# Patient Record
Sex: FEMALE | Race: WHITE | NOT HISPANIC OR LATINO | Employment: OTHER | URBAN - METROPOLITAN AREA
[De-identification: names, ages, dates, MRNs, and addresses within clinical notes are randomized per-mention and may not be internally consistent; named-entity substitution may affect disease eponyms.]

---

## 2017-02-10 ENCOUNTER — OFFICE VISIT (OUTPATIENT)
Dept: CARDIOLOGY | Facility: CLINIC | Age: 82
End: 2017-02-10

## 2017-02-10 VITALS
WEIGHT: 171 LBS | BODY MASS INDEX: 27.48 KG/M2 | DIASTOLIC BLOOD PRESSURE: 78 MMHG | HEIGHT: 66 IN | SYSTOLIC BLOOD PRESSURE: 160 MMHG | HEART RATE: 70 BPM

## 2017-02-10 DIAGNOSIS — I35.0 MILD AORTIC STENOSIS: Primary | Chronic | ICD-10-CM

## 2017-02-10 DIAGNOSIS — I44.7 LBBB (LEFT BUNDLE BRANCH BLOCK): ICD-10-CM

## 2017-02-10 DIAGNOSIS — I50.32 CHRONIC DIASTOLIC HEART FAILURE (HCC): ICD-10-CM

## 2017-02-10 DIAGNOSIS — J43.9 PULMONARY EMPHYSEMA, UNSPECIFIED EMPHYSEMA TYPE (HCC): ICD-10-CM

## 2017-02-10 PROCEDURE — 99214 OFFICE O/P EST MOD 30 MIN: CPT | Performed by: INTERNAL MEDICINE

## 2017-02-10 PROCEDURE — 93000 ELECTROCARDIOGRAM COMPLETE: CPT | Performed by: INTERNAL MEDICINE

## 2017-02-10 RX ORDER — SIMVASTATIN 20 MG
20 TABLET ORAL NIGHTLY
COMMUNITY

## 2017-02-10 RX ORDER — FUROSEMIDE 20 MG/1
20 TABLET ORAL 2 TIMES DAILY
COMMUNITY
End: 2020-12-06 | Stop reason: HOSPADM

## 2017-02-10 NOTE — PROGRESS NOTES
Subjective:     Encounter Date: 2/10/2017      Patient ID: Christine Petersen is a 89 y.o. female.    Chief Complaint:  History of Present Illness     Dear Dr. Kennedy,    This patient comes into the office today for a one-year follow-up of her cardiac status. She has a history of valvular heart disease with mild aortic stenosis.  She had an echocardiogram last year that showed mild aortic stenosis with normal LV function, grade 1 diastolic dysfunction, ejection fraction of 60%, and normal PA pressure.    She does have a history of COPD and is on chronic oxygen therapy.  Over the last year they can recall one time where she needed to use her nebulizer.  Otherwise there is been no problem at all.This patient denies any chest pain, pressure, tightness, squeezing, or heartburn.  This patient has not experienced any feeling of palpitations, tachycardia or heart racing and no presyncope or syncope.  There has not been any problems with dizziness or lightheadedness.  There has not been any orthopnea or PND, and no problems with lower extremity edema.  This patient denies any shortness of breath at rest or with activity and has not had any wheezing.  This patient has not had any problems with unexplained nausea or vomiting. The patient has continued to perform daily activities of living without any specific problem or change in the level of activity.  This patient has not been recently hospitalized for any reason.      The following portions of the patient's history were reviewed and updated as appropriate: allergies, current medications, past family history, past medical history, past social history, past surgical history and problem list.    Past Medical History   Diagnosis Date   • Abdominal aortic aneurysm    • Aortic stenosis    • Congestive heart disease    • COPD (chronic obstructive pulmonary disease)    • Diastolic CHF, chronic    • Malignant neoplasm of lung        History reviewed. No pertinent past surgical  history.    Social History     Social History   • Marital status:      Spouse name: N/A   • Number of children: N/A   • Years of education: N/A     Occupational History   • Not on file.     Social History Main Topics   • Smoking status: Former Smoker   • Smokeless tobacco: Not on file   • Alcohol use No   • Drug use: Not on file   • Sexual activity: Not on file     Other Topics Concern   • Not on file     Social History Narrative         Review of Systems   Constitution: Negative for chills, decreased appetite, fever and night sweats.   HENT: Negative for ear discharge, ear pain, hearing loss, nosebleeds and sore throat.    Eyes: Negative for blurred vision, double vision and pain.   Cardiovascular: Negative for cyanosis.   Respiratory: Negative for hemoptysis and sputum production.    Endocrine: Negative for cold intolerance and heat intolerance.   Hematologic/Lymphatic: Negative for adenopathy.   Skin: Negative for dry skin, itching, nail changes, rash and suspicious lesions.   Musculoskeletal: Negative for arthritis, gout, muscle cramps, muscle weakness, myalgias and neck pain.   Gastrointestinal: Negative for anorexia, bowel incontinence, constipation, diarrhea, dysphagia, hematemesis and jaundice.   Genitourinary: Negative for bladder incontinence, dysuria, flank pain, frequency, hematuria and nocturia.   Neurological: Negative for focal weakness, numbness, paresthesias and seizures.   Psychiatric/Behavioral: Negative for altered mental status, hallucinations, hypervigilance, suicidal ideas and thoughts of violence.   Allergic/Immunologic: Negative for persistent infections.         ECG 12 Lead  Date/Time: 2/10/2017 3:57 PM  Performed by: SHYLA LOBO III  Authorized by: SHYLA LOBO III   Comparison: compared with previous ECG   Similar to previous ECG  Rhythm: sinus rhythm  Rate: normal  Conduction: left bundle branch block  QRS axis: left  Other: no other findings  Clinical impression: abnormal  "ECG               Objective:     Vitals:    02/10/17 1533   BP: 160/78   Pulse: 70   Weight: 171 lb (77.6 kg)   Height: 66\" (167.6 cm)         Physical Exam   Constitutional: She is oriented to person, place, and time. She appears well-developed and well-nourished. No distress.   HENT:   Head: Normocephalic and atraumatic.   Nose: Nose normal.   Mouth/Throat: Oropharynx is clear and moist.   Eyes: Conjunctivae and EOM are normal. Pupils are equal, round, and reactive to light. Right eye exhibits no discharge. Left eye exhibits no discharge.   Neck: Normal range of motion. Neck supple. No tracheal deviation present. No thyromegaly present.   Cardiovascular: Normal rate, regular rhythm, S1 normal, S2 normal and normal pulses.  Exam reveals no S3.    Murmur heard.   Harsh midsystolic murmur is present with a grade of 1/6  at the upper right sternal border radiating to the neck  Pulmonary/Chest: Effort normal. No stridor. No respiratory distress. She exhibits no tenderness.   Abdominal: Soft. Bowel sounds are normal. She exhibits no distension and no mass. There is no tenderness. There is no rebound and no guarding.   Musculoskeletal: Normal range of motion. She exhibits no tenderness or deformity.   Lymphadenopathy:     She has no cervical adenopathy.   Neurological: She is alert and oriented to person, place, and time. She has normal reflexes.   Skin: Skin is warm and dry. No rash noted. She is not diaphoretic. No erythema.   Psychiatric: She has a normal mood and affect. Thought content normal.       Lab Review:       Assessment:          Diagnosis Plan   1. Mild aortic stenosis  ECG 12 Lead   2. Chronic diastolic heart failure     3. Pulmonary emphysema, unspecified emphysema type     4. LBBB (left bundle branch block)  ECG 12 Lead          Plan:         This patient is doing well from a cardiac standpoint.  We will continue her current medical regimen and I will see her back in the office in one year.       Heart " Failure  Assessment  • NYHA class I - There is no limitation of physical activity. Physical activity does not cause fatigue, palpitations or shortness of breath.  • Beta blocker prescribed  • Diuretics prescribed  • Angiotensin receptor blocker (ARB) prescribed  • Left ventricular function is normal by qualitative assessment    Plan  • The patient has received heart failure education on the following topics: dietary sodium restriction, medication instructions, symptom management and weight monitoring  • The heart failure care plan was discussed with the patient today including: continuing the current program    Subjective/Objective    • Physical exam findings negative for S3 gallop.        Current Outpatient Prescriptions:   •  amLODIPine (NORVASC) 10 MG tablet, Take 1 tablet by mouth daily., Disp: , Rfl:   •  aspirin 81 MG tablet, Take 1 tablet by mouth daily., Disp: , Rfl:   •  atenolol (TENORMIN) 25 MG tablet, Take 75 mg by mouth 3 (Three) Times a Day., Disp: , Rfl:   •  Calcium Carbonate-Vitamin D (CALCIUM + D PO), Take 1 tablet by mouth Daily., Disp: , Rfl:   •  furosemide (LASIX) 20 MG tablet, Take 20 mg by mouth 2 (Two) Times a Day., Disp: , Rfl:   •  gabapentin (NEURONTIN) 300 MG capsule, Take 1 capsule by mouth 2 (two) times a day., Disp: , Rfl:   •  losartan (COZAAR) 100 MG tablet, Take 1 tablet by mouth daily., Disp: , Rfl:   •  Multiple Vitamin (MULTI-VITAMIN) tablet, Take 1 tablet by mouth daily., Disp: , Rfl:   •  promethazine (PHENERGAN) 25 MG tablet, Take 1 tablet by mouth 3 (three) times a day as needed., Disp: , Rfl:   •  simvastatin (ZOCOR) 20 MG tablet, Take 20 mg by mouth Every Night., Disp: , Rfl:   •  Tetrahydrozoline HCl (EYE DROPS OP), Apply  to eye., Disp: , Rfl:   •  Timolol Maleate 0.5 % (DAILY) solution, Apply 1 drop to eye daily., Disp: , Rfl:   •  Umeclidinium-Vilanterol 62.5-25 MCG/INH aerosol powder , Inhale., Disp: , Rfl:

## 2020-11-28 ENCOUNTER — APPOINTMENT (OUTPATIENT)
Dept: GENERAL RADIOLOGY | Facility: HOSPITAL | Age: 85
End: 2020-11-28

## 2020-11-28 ENCOUNTER — APPOINTMENT (OUTPATIENT)
Dept: CARDIOLOGY | Facility: HOSPITAL | Age: 85
End: 2020-11-28

## 2020-11-28 ENCOUNTER — HOSPITAL ENCOUNTER (INPATIENT)
Facility: HOSPITAL | Age: 85
LOS: 8 days | Discharge: NURSING, REHAB OR LONG-TERM CARE | End: 2020-12-06
Attending: EMERGENCY MEDICINE | Admitting: INTERNAL MEDICINE

## 2020-11-28 DIAGNOSIS — J96.02 ACUTE RESPIRATORY FAILURE WITH HYPOXIA AND HYPERCAPNIA (HCC): Primary | ICD-10-CM

## 2020-11-28 DIAGNOSIS — J96.01 ACUTE RESPIRATORY FAILURE WITH HYPOXIA AND HYPERCAPNIA (HCC): Primary | ICD-10-CM

## 2020-11-28 DIAGNOSIS — I50.9 ACUTE CONGESTIVE HEART FAILURE, UNSPECIFIED HEART FAILURE TYPE (HCC): ICD-10-CM

## 2020-11-28 LAB
ALBUMIN SERPL-MCNC: 3.8 G/DL (ref 3.5–5.2)
ALBUMIN/GLOB SERPL: 1.2 G/DL
ALP SERPL-CCNC: 66 U/L (ref 39–117)
ALT SERPL W P-5'-P-CCNC: 10 U/L (ref 1–33)
ANION GAP SERPL CALCULATED.3IONS-SCNC: 11.1 MMOL/L (ref 5–15)
AORTIC DIMENSIONLESS INDEX: 0.3 (DI)
ARTERIAL PATENCY WRIST A: ABNORMAL
AST SERPL-CCNC: 19 U/L (ref 1–32)
ATMOSPHERIC PRESS: 757.9 MMHG
B PARAPERT DNA SPEC QL NAA+PROBE: NOT DETECTED
B PERT DNA SPEC QL NAA+PROBE: NOT DETECTED
BASE EXCESS BLDA CALC-SCNC: 1.5 MMOL/L (ref 0–2)
BASOPHILS # BLD AUTO: 0.04 10*3/MM3 (ref 0–0.2)
BASOPHILS NFR BLD AUTO: 0.6 % (ref 0–1.5)
BDY SITE: ABNORMAL
BH CV ECHO MEAS - AO MAX PG (FULL): 25 MMHG
BH CV ECHO MEAS - AO MAX PG: 27.7 MMHG
BH CV ECHO MEAS - AO MEAN PG (FULL): 14 MMHG
BH CV ECHO MEAS - AO MEAN PG: 15 MMHG
BH CV ECHO MEAS - AO ROOT AREA (BSA CORRECTED): 1.9
BH CV ECHO MEAS - AO ROOT AREA: 8.6 CM^2
BH CV ECHO MEAS - AO ROOT DIAM: 3.3 CM
BH CV ECHO MEAS - AO V2 MAX: 263 CM/SEC
BH CV ECHO MEAS - AO V2 MEAN: 181 CM/SEC
BH CV ECHO MEAS - AO V2 VTI: 48.4 CM
BH CV ECHO MEAS - ASC AORTA: 3.6 CM
BH CV ECHO MEAS - AVA(I,A): 0.98 CM^2
BH CV ECHO MEAS - AVA(I,D): 0.98 CM^2
BH CV ECHO MEAS - AVA(V,A): 0.97 CM^2
BH CV ECHO MEAS - AVA(V,D): 0.97 CM^2
BH CV ECHO MEAS - BSA(HAYCOCK): 1.8 M^2
BH CV ECHO MEAS - BSA: 1.8 M^2
BH CV ECHO MEAS - BZI_BMI: 24 KILOGRAMS/M^2
BH CV ECHO MEAS - BZI_METRIC_HEIGHT: 167 CM
BH CV ECHO MEAS - BZI_METRIC_WEIGHT: 67 KG
BH CV ECHO MEAS - EDV(CUBED): 103.8 ML
BH CV ECHO MEAS - EDV(MOD-SP2): 125 ML
BH CV ECHO MEAS - EDV(MOD-SP4): 166 ML
BH CV ECHO MEAS - EDV(TEICH): 102.4 ML
BH CV ECHO MEAS - EF(CUBED): 38.4 %
BH CV ECHO MEAS - EF(MOD-BP): 28.2 %
BH CV ECHO MEAS - EF(MOD-SP2): 35.2 %
BH CV ECHO MEAS - EF(MOD-SP4): 24.1 %
BH CV ECHO MEAS - EF(TEICH): 31.6 %
BH CV ECHO MEAS - ESV(CUBED): 64 ML
BH CV ECHO MEAS - ESV(MOD-SP2): 81 ML
BH CV ECHO MEAS - ESV(MOD-SP4): 126 ML
BH CV ECHO MEAS - ESV(TEICH): 70 ML
BH CV ECHO MEAS - FS: 14.9 %
BH CV ECHO MEAS - IVS/LVPW: 1.3
BH CV ECHO MEAS - IVSD: 1.6 CM
BH CV ECHO MEAS - LV DIASTOLIC VOL/BSA (35-75): 94.7 ML/M^2
BH CV ECHO MEAS - LV MASS(C)D: 265.2 GRAMS
BH CV ECHO MEAS - LV MASS(C)DI: 151.2 GRAMS/M^2
BH CV ECHO MEAS - LV MAX PG: 2.6 MMHG
BH CV ECHO MEAS - LV MEAN PG: 1 MMHG
BH CV ECHO MEAS - LV SYSTOLIC VOL/BSA (12-30): 71.9 ML/M^2
BH CV ECHO MEAS - LV V1 MAX: 81.3 CM/SEC
BH CV ECHO MEAS - LV V1 MEAN: 51.4 CM/SEC
BH CV ECHO MEAS - LV V1 VTI: 15.1 CM
BH CV ECHO MEAS - LVIDD: 4.7 CM
BH CV ECHO MEAS - LVIDS: 4 CM
BH CV ECHO MEAS - LVLD AP2: 8.1 CM
BH CV ECHO MEAS - LVLD AP4: 8.8 CM
BH CV ECHO MEAS - LVLS AP2: 7.5 CM
BH CV ECHO MEAS - LVLS AP4: 8.5 CM
BH CV ECHO MEAS - LVOT AREA (M): 3.1 CM^2
BH CV ECHO MEAS - LVOT AREA: 3.1 CM^2
BH CV ECHO MEAS - LVOT DIAM: 2 CM
BH CV ECHO MEAS - LVPWD: 1.2 CM
BH CV ECHO MEAS - MV DEC SLOPE: 1407 CM/SEC^2
BH CV ECHO MEAS - MV MAX PG: 17.3 MMHG
BH CV ECHO MEAS - MV MEAN PG: 7 MMHG
BH CV ECHO MEAS - MV P1/2T MAX VEL: 219 CM/SEC
BH CV ECHO MEAS - MV P1/2T: 45.6 MSEC
BH CV ECHO MEAS - MV V2 MAX: 208 CM/SEC
BH CV ECHO MEAS - MV V2 MEAN: 119 CM/SEC
BH CV ECHO MEAS - MV V2 VTI: 29.6 CM
BH CV ECHO MEAS - MVA P1/2T LCG: 1 CM^2
BH CV ECHO MEAS - MVA(P1/2T): 4.8 CM^2
BH CV ECHO MEAS - MVA(VTI): 1.6 CM^2
BH CV ECHO MEAS - RAP SYSTOLE: 8 MMHG
BH CV ECHO MEAS - RVOT AREA: 3.1 CM^2
BH CV ECHO MEAS - RVOT DIAM: 2 CM
BH CV ECHO MEAS - RVSP: 48 MMHG
BH CV ECHO MEAS - SI(AO): 236.1 ML/M^2
BH CV ECHO MEAS - SI(CUBED): 22.7 ML/M^2
BH CV ECHO MEAS - SI(LVOT): 27.1 ML/M^2
BH CV ECHO MEAS - SI(MOD-SP2): 25.1 ML/M^2
BH CV ECHO MEAS - SI(MOD-SP4): 22.8 ML/M^2
BH CV ECHO MEAS - SI(TEICH): 18.5 ML/M^2
BH CV ECHO MEAS - SV(AO): 414 ML
BH CV ECHO MEAS - SV(CUBED): 39.8 ML
BH CV ECHO MEAS - SV(LVOT): 47.4 ML
BH CV ECHO MEAS - SV(MOD-SP2): 44 ML
BH CV ECHO MEAS - SV(MOD-SP4): 40 ML
BH CV ECHO MEAS - SV(TEICH): 32.4 ML
BH CV ECHO MEAS - TAPSE (>1.6): 2.8 CM
BH CV ECHO MEAS - TR MAX PG: 40 MMHG
BH CV ECHO MEAS - TR MAX VEL: 315 CM/SEC
BH CV XLRA - RV BASE: 3.9 CM
BH CV XLRA - TDI S': 16.9 CM/SEC
BILIRUB SERPL-MCNC: 0.6 MG/DL (ref 0–1.2)
BUN SERPL-MCNC: 9 MG/DL (ref 8–23)
BUN/CREAT SERPL: 10.8 (ref 7–25)
C PNEUM DNA NPH QL NAA+NON-PROBE: NOT DETECTED
CALCIUM SPEC-SCNC: 8.8 MG/DL (ref 8.2–9.6)
CHLORIDE SERPL-SCNC: 95 MMOL/L (ref 98–107)
CO2 SERPL-SCNC: 23.9 MMOL/L (ref 22–29)
CREAT SERPL-MCNC: 0.83 MG/DL (ref 0.57–1)
D-LACTATE SERPL-SCNC: 1.9 MMOL/L (ref 0.5–2)
D-LACTATE SERPL-SCNC: 2.2 MMOL/L (ref 0.5–2)
DEPRECATED RDW RBC AUTO: 43.7 FL (ref 37–54)
EOSINOPHIL # BLD AUTO: 0.13 10*3/MM3 (ref 0–0.4)
EOSINOPHIL NFR BLD AUTO: 1.8 % (ref 0.3–6.2)
ERYTHROCYTE [DISTWIDTH] IN BLOOD BY AUTOMATED COUNT: 12.8 % (ref 12.3–15.4)
FLUAV SUBTYP SPEC NAA+PROBE: NOT DETECTED
FLUBV RNA ISLT QL NAA+PROBE: NOT DETECTED
GFR SERPL CREATININE-BSD FRML MDRD: 64 ML/MIN/1.73
GLOBULIN UR ELPH-MCNC: 3.3 GM/DL
GLUCOSE BLDC GLUCOMTR-MCNC: 116 MG/DL (ref 70–130)
GLUCOSE BLDC GLUCOMTR-MCNC: 135 MG/DL (ref 70–130)
GLUCOSE BLDC GLUCOMTR-MCNC: 85 MG/DL (ref 70–130)
GLUCOSE SERPL-MCNC: 266 MG/DL (ref 65–99)
HADV DNA SPEC NAA+PROBE: NOT DETECTED
HCO3 BLDA-SCNC: 30.1 MMOL/L (ref 22–28)
HCOV 229E RNA SPEC QL NAA+PROBE: NOT DETECTED
HCOV HKU1 RNA SPEC QL NAA+PROBE: NOT DETECTED
HCOV NL63 RNA SPEC QL NAA+PROBE: NOT DETECTED
HCOV OC43 RNA SPEC QL NAA+PROBE: NOT DETECTED
HCT VFR BLD AUTO: 44.4 % (ref 34–46.6)
HGB BLD-MCNC: 14.1 G/DL (ref 12–15.9)
HMPV RNA NPH QL NAA+NON-PROBE: NOT DETECTED
HPIV1 RNA SPEC QL NAA+PROBE: NOT DETECTED
HPIV2 RNA SPEC QL NAA+PROBE: NOT DETECTED
HPIV3 RNA NPH QL NAA+PROBE: NOT DETECTED
HPIV4 P GENE NPH QL NAA+PROBE: NOT DETECTED
IMM GRANULOCYTES # BLD AUTO: 0.05 10*3/MM3 (ref 0–0.05)
IMM GRANULOCYTES NFR BLD AUTO: 0.7 % (ref 0–0.5)
INHALED O2 CONCENTRATION: 50 %
LACTATE HOLD SPECIMEN: NORMAL
LEFT ATRIUM VOLUME INDEX: 35.8 ML/M2
LYMPHOCYTES # BLD AUTO: 2 10*3/MM3 (ref 0.7–3.1)
LYMPHOCYTES NFR BLD AUTO: 27.9 % (ref 19.6–45.3)
M PNEUMO IGG SER IA-ACNC: NOT DETECTED
MAXIMAL PREDICTED HEART RATE: 125 BPM
MCH RBC QN AUTO: 29.3 PG (ref 26.6–33)
MCHC RBC AUTO-ENTMCNC: 31.8 G/DL (ref 31.5–35.7)
MCV RBC AUTO: 92.3 FL (ref 79–97)
MODALITY: ABNORMAL
MONOCYTES # BLD AUTO: 0.69 10*3/MM3 (ref 0.1–0.9)
MONOCYTES NFR BLD AUTO: 9.6 % (ref 5–12)
NEUTROPHILS NFR BLD AUTO: 4.27 10*3/MM3 (ref 1.7–7)
NEUTROPHILS NFR BLD AUTO: 59.4 % (ref 42.7–76)
NRBC BLD AUTO-RTO: 0 /100 WBC (ref 0–0.2)
NT-PROBNP SERPL-MCNC: 4551 PG/ML (ref 0–1800)
O2 A-A PPRESDIFF RESPIRATORY: 0.3 MMHG
PCO2 BLDA: 62.7 MM HG (ref 35–45)
PEEP RESPIRATORY: 8 CM[H2O]
PH BLDA: 7.29 PH UNITS (ref 7.35–7.45)
PLATELET # BLD AUTO: 294 10*3/MM3 (ref 140–450)
PMV BLD AUTO: 9.6 FL (ref 6–12)
PO2 BLDA: 100.8 MM HG (ref 80–100)
POTASSIUM SERPL-SCNC: 4.3 MMOL/L (ref 3.5–5.2)
PROCALCITONIN SERPL-MCNC: 0.05 NG/ML (ref 0–0.25)
PROT SERPL-MCNC: 7.1 G/DL (ref 6–8.5)
QT INTERVAL: 380 MS
RBC # BLD AUTO: 4.81 10*6/MM3 (ref 3.77–5.28)
RHINOVIRUS RNA SPEC NAA+PROBE: NOT DETECTED
RSV RNA NPH QL NAA+NON-PROBE: NOT DETECTED
SAO2 % BLDCOA: 96.7 % (ref 92–99)
SARS-COV-2 RNA NPH QL NAA+NON-PROBE: NOT DETECTED
SET MECH RESP RATE: 20
SODIUM SERPL-SCNC: 130 MMOL/L (ref 136–145)
STRESS TARGET HR: 106 BPM
TOTAL RATE: 29 BREATHS/MINUTE
TROPONIN T SERPL-MCNC: 0.03 NG/ML (ref 0–0.03)
TROPONIN T SERPL-MCNC: 0.04 NG/ML (ref 0–0.03)
TROPONIN T SERPL-MCNC: <0.01 NG/ML (ref 0–0.03)
VT ON VENT VENT: 400 ML
WBC # BLD AUTO: 7.18 10*3/MM3 (ref 3.4–10.8)

## 2020-11-28 PROCEDURE — 87147 CULTURE TYPE IMMUNOLOGIC: CPT | Performed by: EMERGENCY MEDICINE

## 2020-11-28 PROCEDURE — 25010000002 ENOXAPARIN PER 10 MG: Performed by: INTERNAL MEDICINE

## 2020-11-28 PROCEDURE — 25010000002 PHENYLEPHRINE 10 MG/ML SOLUTION 5 ML VIAL: Performed by: INTERNAL MEDICINE

## 2020-11-28 PROCEDURE — 80053 COMPREHEN METABOLIC PANEL: CPT | Performed by: EMERGENCY MEDICINE

## 2020-11-28 PROCEDURE — 99285 EMERGENCY DEPT VISIT HI MDM: CPT

## 2020-11-28 PROCEDURE — 36600 WITHDRAWAL OF ARTERIAL BLOOD: CPT

## 2020-11-28 PROCEDURE — 85025 COMPLETE CBC W/AUTO DIFF WBC: CPT | Performed by: EMERGENCY MEDICINE

## 2020-11-28 PROCEDURE — 94799 UNLISTED PULMONARY SVC/PX: CPT

## 2020-11-28 PROCEDURE — 25010000002 PERFLUTREN (DEFINITY) 8.476 MG IN SODIUM CHLORIDE 0.9 % 10 ML INJECTION: Performed by: INTERNAL MEDICINE

## 2020-11-28 PROCEDURE — 83880 ASSAY OF NATRIURETIC PEPTIDE: CPT | Performed by: EMERGENCY MEDICINE

## 2020-11-28 PROCEDURE — 83605 ASSAY OF LACTIC ACID: CPT | Performed by: EMERGENCY MEDICINE

## 2020-11-28 PROCEDURE — 87040 BLOOD CULTURE FOR BACTERIA: CPT | Performed by: EMERGENCY MEDICINE

## 2020-11-28 PROCEDURE — 82962 GLUCOSE BLOOD TEST: CPT

## 2020-11-28 PROCEDURE — 93306 TTE W/DOPPLER COMPLETE: CPT

## 2020-11-28 PROCEDURE — 84145 PROCALCITONIN (PCT): CPT | Performed by: EMERGENCY MEDICINE

## 2020-11-28 PROCEDURE — 0202U NFCT DS 22 TRGT SARS-COV-2: CPT | Performed by: EMERGENCY MEDICINE

## 2020-11-28 PROCEDURE — 84484 ASSAY OF TROPONIN QUANT: CPT | Performed by: INTERNAL MEDICINE

## 2020-11-28 PROCEDURE — 94640 AIRWAY INHALATION TREATMENT: CPT

## 2020-11-28 PROCEDURE — 93306 TTE W/DOPPLER COMPLETE: CPT | Performed by: INTERNAL MEDICINE

## 2020-11-28 PROCEDURE — 94660 CPAP INITIATION&MGMT: CPT

## 2020-11-28 PROCEDURE — 93005 ELECTROCARDIOGRAM TRACING: CPT | Performed by: EMERGENCY MEDICINE

## 2020-11-28 PROCEDURE — 99222 1ST HOSP IP/OBS MODERATE 55: CPT | Performed by: INTERNAL MEDICINE

## 2020-11-28 PROCEDURE — 71045 X-RAY EXAM CHEST 1 VIEW: CPT

## 2020-11-28 PROCEDURE — 87150 DNA/RNA AMPLIFIED PROBE: CPT | Performed by: EMERGENCY MEDICINE

## 2020-11-28 PROCEDURE — 82803 BLOOD GASES ANY COMBINATION: CPT

## 2020-11-28 PROCEDURE — 84484 ASSAY OF TROPONIN QUANT: CPT | Performed by: EMERGENCY MEDICINE

## 2020-11-28 PROCEDURE — 25010000002 FUROSEMIDE PER 20 MG: Performed by: EMERGENCY MEDICINE

## 2020-11-28 PROCEDURE — 25010000002 FUROSEMIDE PER 20 MG: Performed by: INTERNAL MEDICINE

## 2020-11-28 RX ORDER — BUDESONIDE AND FORMOTEROL FUMARATE DIHYDRATE 160; 4.5 UG/1; UG/1
2 AEROSOL RESPIRATORY (INHALATION)
Status: DISCONTINUED | OUTPATIENT
Start: 2020-11-28 | End: 2020-12-06 | Stop reason: HOSPADM

## 2020-11-28 RX ORDER — LOSARTAN POTASSIUM 100 MG/1
100 TABLET ORAL DAILY
Status: DISCONTINUED | OUTPATIENT
Start: 2020-11-28 | End: 2020-11-30

## 2020-11-28 RX ORDER — SODIUM CHLORIDE 0.9 % (FLUSH) 0.9 %
10 SYRINGE (ML) INJECTION EVERY 12 HOURS SCHEDULED
Status: DISCONTINUED | OUTPATIENT
Start: 2020-11-28 | End: 2020-12-06 | Stop reason: HOSPADM

## 2020-11-28 RX ORDER — DEXTROSE MONOHYDRATE 25 G/50ML
25 INJECTION, SOLUTION INTRAVENOUS
Status: DISCONTINUED | OUTPATIENT
Start: 2020-11-28 | End: 2020-12-01

## 2020-11-28 RX ORDER — GABAPENTIN 300 MG/1
300 CAPSULE ORAL EVERY 12 HOURS SCHEDULED
Status: DISCONTINUED | OUTPATIENT
Start: 2020-11-28 | End: 2020-12-06 | Stop reason: HOSPADM

## 2020-11-28 RX ORDER — ONDANSETRON 4 MG/1
4 TABLET, FILM COATED ORAL EVERY 6 HOURS PRN
Status: DISCONTINUED | OUTPATIENT
Start: 2020-11-28 | End: 2020-12-06 | Stop reason: HOSPADM

## 2020-11-28 RX ORDER — SODIUM CHLORIDE 0.9 % (FLUSH) 0.9 %
10 SYRINGE (ML) INJECTION AS NEEDED
Status: DISCONTINUED | OUTPATIENT
Start: 2020-11-28 | End: 2020-12-06 | Stop reason: HOSPADM

## 2020-11-28 RX ORDER — NICOTINE POLACRILEX 4 MG
15 LOZENGE BUCCAL
Status: DISCONTINUED | OUTPATIENT
Start: 2020-11-28 | End: 2020-12-01

## 2020-11-28 RX ORDER — ENALAPRILAT 2.5 MG/2ML
0.62 INJECTION INTRAVENOUS EVERY 6 HOURS PRN
Status: DISCONTINUED | OUTPATIENT
Start: 2020-11-28 | End: 2020-12-06 | Stop reason: HOSPADM

## 2020-11-28 RX ORDER — FUROSEMIDE 10 MG/ML
40 INJECTION INTRAMUSCULAR; INTRAVENOUS ONCE
Status: COMPLETED | OUTPATIENT
Start: 2020-11-28 | End: 2020-11-28

## 2020-11-28 RX ORDER — ATORVASTATIN CALCIUM 20 MG/1
10 TABLET, FILM COATED ORAL DAILY
Status: DISCONTINUED | OUTPATIENT
Start: 2020-11-28 | End: 2020-12-06 | Stop reason: HOSPADM

## 2020-11-28 RX ORDER — IPRATROPIUM BROMIDE AND ALBUTEROL SULFATE 2.5; .5 MG/3ML; MG/3ML
3 SOLUTION RESPIRATORY (INHALATION)
Status: DISCONTINUED | OUTPATIENT
Start: 2020-11-28 | End: 2020-12-06 | Stop reason: HOSPADM

## 2020-11-28 RX ORDER — ONDANSETRON 2 MG/ML
4 INJECTION INTRAMUSCULAR; INTRAVENOUS EVERY 6 HOURS PRN
Status: DISCONTINUED | OUTPATIENT
Start: 2020-11-28 | End: 2020-12-06 | Stop reason: HOSPADM

## 2020-11-28 RX ORDER — POTASSIUM CHLORIDE 1.5 G/1.77G
40 POWDER, FOR SOLUTION ORAL AS NEEDED
Status: DISCONTINUED | OUTPATIENT
Start: 2020-11-28 | End: 2020-12-06 | Stop reason: HOSPADM

## 2020-11-28 RX ORDER — MAGNESIUM SULFATE HEPTAHYDRATE 40 MG/ML
2 INJECTION, SOLUTION INTRAVENOUS AS NEEDED
Status: DISCONTINUED | OUTPATIENT
Start: 2020-11-28 | End: 2020-12-06 | Stop reason: HOSPADM

## 2020-11-28 RX ORDER — POTASSIUM CHLORIDE 7.45 MG/ML
10 INJECTION INTRAVENOUS
Status: DISCONTINUED | OUTPATIENT
Start: 2020-11-28 | End: 2020-12-06 | Stop reason: HOSPADM

## 2020-11-28 RX ORDER — ACETAMINOPHEN 325 MG/1
650 TABLET ORAL EVERY 4 HOURS PRN
Status: DISCONTINUED | OUTPATIENT
Start: 2020-11-28 | End: 2020-12-06 | Stop reason: HOSPADM

## 2020-11-28 RX ORDER — ACETAMINOPHEN 650 MG/1
650 SUPPOSITORY RECTAL EVERY 4 HOURS PRN
Status: DISCONTINUED | OUTPATIENT
Start: 2020-11-28 | End: 2020-12-06 | Stop reason: HOSPADM

## 2020-11-28 RX ORDER — POTASSIUM CHLORIDE 750 MG/1
40 TABLET, FILM COATED, EXTENDED RELEASE ORAL AS NEEDED
Status: DISCONTINUED | OUTPATIENT
Start: 2020-11-28 | End: 2020-12-06 | Stop reason: HOSPADM

## 2020-11-28 RX ORDER — FUROSEMIDE 10 MG/ML
40 INJECTION INTRAMUSCULAR; INTRAVENOUS
Status: DISCONTINUED | OUTPATIENT
Start: 2020-11-28 | End: 2020-11-28

## 2020-11-28 RX ORDER — MAGNESIUM SULFATE HEPTAHYDRATE 40 MG/ML
4 INJECTION, SOLUTION INTRAVENOUS AS NEEDED
Status: DISCONTINUED | OUTPATIENT
Start: 2020-11-28 | End: 2020-12-06 | Stop reason: HOSPADM

## 2020-11-28 RX ORDER — ASPIRIN 81 MG/1
81 TABLET ORAL DAILY
Status: DISCONTINUED | OUTPATIENT
Start: 2020-11-28 | End: 2020-12-06 | Stop reason: HOSPADM

## 2020-11-28 RX ADMIN — GABAPENTIN 300 MG: 300 CAPSULE ORAL at 12:36

## 2020-11-28 RX ADMIN — FUROSEMIDE 40 MG: 10 INJECTION, SOLUTION INTRAMUSCULAR; INTRAVENOUS at 09:19

## 2020-11-28 RX ADMIN — FUROSEMIDE 40 MG: 10 INJECTION, SOLUTION INTRAMUSCULAR; INTRAVENOUS at 12:27

## 2020-11-28 RX ADMIN — BUDESONIDE AND FORMOTEROL FUMARATE DIHYDRATE 2 PUFF: 160; 4.5 AEROSOL RESPIRATORY (INHALATION) at 19:54

## 2020-11-28 RX ADMIN — ASPIRIN 81 MG: 81 TABLET, COATED ORAL at 12:28

## 2020-11-28 RX ADMIN — GABAPENTIN 300 MG: 300 CAPSULE ORAL at 20:03

## 2020-11-28 RX ADMIN — PHENYLEPHRINE HYDROCHLORIDE 0.5 MCG/KG/MIN: 10 INJECTION INTRAVENOUS at 17:54

## 2020-11-28 RX ADMIN — ENOXAPARIN SODIUM 40 MG: 40 INJECTION SUBCUTANEOUS at 12:27

## 2020-11-28 RX ADMIN — ATORVASTATIN CALCIUM 10 MG: 20 TABLET, FILM COATED ORAL at 12:27

## 2020-11-28 RX ADMIN — SODIUM CHLORIDE, PRESERVATIVE FREE 10 ML: 5 INJECTION INTRAVENOUS at 12:27

## 2020-11-28 RX ADMIN — PERFLUTREN 2 ML: 6.52 INJECTION, SUSPENSION INTRAVENOUS at 13:55

## 2020-11-28 RX ADMIN — TIOTROPIUM BROMIDE INHALATION SPRAY 2 PUFF: 3.12 SPRAY, METERED RESPIRATORY (INHALATION) at 16:00

## 2020-11-28 RX ADMIN — BUDESONIDE AND FORMOTEROL FUMARATE DIHYDRATE 2 PUFF: 160; 4.5 AEROSOL RESPIRATORY (INHALATION) at 16:00

## 2020-11-28 RX ADMIN — LOSARTAN POTASSIUM 100 MG: 100 TABLET, FILM COATED ORAL at 12:27

## 2020-11-29 LAB
ALBUMIN SERPL-MCNC: 3.4 G/DL (ref 3.5–5.2)
ALBUMIN/GLOB SERPL: 1.3 G/DL
ALP SERPL-CCNC: 54 U/L (ref 39–117)
ALT SERPL W P-5'-P-CCNC: 9 U/L (ref 1–33)
ANION GAP SERPL CALCULATED.3IONS-SCNC: 11.7 MMOL/L (ref 5–15)
AST SERPL-CCNC: 19 U/L (ref 1–32)
BACTERIA BLD CULT: ABNORMAL
BASOPHILS # BLD AUTO: 0.02 10*3/MM3 (ref 0–0.2)
BASOPHILS NFR BLD AUTO: 0.3 % (ref 0–1.5)
BILIRUB SERPL-MCNC: 0.7 MG/DL (ref 0–1.2)
BUN SERPL-MCNC: 13 MG/DL (ref 8–23)
BUN/CREAT SERPL: 17.8 (ref 7–25)
CALCIUM SPEC-SCNC: 8.4 MG/DL (ref 8.2–9.6)
CHLORIDE SERPL-SCNC: 91 MMOL/L (ref 98–107)
CO2 SERPL-SCNC: 26.3 MMOL/L (ref 22–29)
CREAT SERPL-MCNC: 0.73 MG/DL (ref 0.57–1)
DEPRECATED RDW RBC AUTO: 42.6 FL (ref 37–54)
EOSINOPHIL # BLD AUTO: 0.03 10*3/MM3 (ref 0–0.4)
EOSINOPHIL NFR BLD AUTO: 0.4 % (ref 0.3–6.2)
ERYTHROCYTE [DISTWIDTH] IN BLOOD BY AUTOMATED COUNT: 13.1 % (ref 12.3–15.4)
GFR SERPL CREATININE-BSD FRML MDRD: 74 ML/MIN/1.73
GLOBULIN UR ELPH-MCNC: 2.6 GM/DL
GLUCOSE BLDC GLUCOMTR-MCNC: 133 MG/DL (ref 70–130)
GLUCOSE BLDC GLUCOMTR-MCNC: 85 MG/DL (ref 70–130)
GLUCOSE BLDC GLUCOMTR-MCNC: 88 MG/DL (ref 70–130)
GLUCOSE SERPL-MCNC: 106 MG/DL (ref 65–99)
HCT VFR BLD AUTO: 37.5 % (ref 34–46.6)
HGB BLD-MCNC: 12.4 G/DL (ref 12–15.9)
IMM GRANULOCYTES # BLD AUTO: 0.04 10*3/MM3 (ref 0–0.05)
IMM GRANULOCYTES NFR BLD AUTO: 0.6 % (ref 0–0.5)
LYMPHOCYTES # BLD AUTO: 0.66 10*3/MM3 (ref 0.7–3.1)
LYMPHOCYTES NFR BLD AUTO: 9.9 % (ref 19.6–45.3)
MCH RBC QN AUTO: 29.7 PG (ref 26.6–33)
MCHC RBC AUTO-ENTMCNC: 33.1 G/DL (ref 31.5–35.7)
MCV RBC AUTO: 89.7 FL (ref 79–97)
MONOCYTES # BLD AUTO: 0.45 10*3/MM3 (ref 0.1–0.9)
MONOCYTES NFR BLD AUTO: 6.7 % (ref 5–12)
NEUTROPHILS NFR BLD AUTO: 5.49 10*3/MM3 (ref 1.7–7)
NEUTROPHILS NFR BLD AUTO: 82.1 % (ref 42.7–76)
NRBC BLD AUTO-RTO: 0 /100 WBC (ref 0–0.2)
PLATELET # BLD AUTO: 226 10*3/MM3 (ref 140–450)
PMV BLD AUTO: 9.6 FL (ref 6–12)
POTASSIUM SERPL-SCNC: 4 MMOL/L (ref 3.5–5.2)
PROT SERPL-MCNC: 6 G/DL (ref 6–8.5)
RBC # BLD AUTO: 4.18 10*6/MM3 (ref 3.77–5.28)
SODIUM SERPL-SCNC: 129 MMOL/L (ref 136–145)
TROPONIN T SERPL-MCNC: 0.02 NG/ML (ref 0–0.03)
WBC # BLD AUTO: 6.69 10*3/MM3 (ref 3.4–10.8)

## 2020-11-29 PROCEDURE — 82962 GLUCOSE BLOOD TEST: CPT

## 2020-11-29 PROCEDURE — 99232 SBSQ HOSP IP/OBS MODERATE 35: CPT | Performed by: INTERNAL MEDICINE

## 2020-11-29 PROCEDURE — 94799 UNLISTED PULMONARY SVC/PX: CPT

## 2020-11-29 PROCEDURE — 80053 COMPREHEN METABOLIC PANEL: CPT | Performed by: INTERNAL MEDICINE

## 2020-11-29 PROCEDURE — 25010000002 ENOXAPARIN PER 10 MG: Performed by: INTERNAL MEDICINE

## 2020-11-29 PROCEDURE — 85025 COMPLETE CBC W/AUTO DIFF WBC: CPT | Performed by: INTERNAL MEDICINE

## 2020-11-29 PROCEDURE — 84484 ASSAY OF TROPONIN QUANT: CPT | Performed by: INTERNAL MEDICINE

## 2020-11-29 RX ADMIN — GABAPENTIN 300 MG: 300 CAPSULE ORAL at 20:32

## 2020-11-29 RX ADMIN — GABAPENTIN 300 MG: 300 CAPSULE ORAL at 08:02

## 2020-11-29 RX ADMIN — BUDESONIDE AND FORMOTEROL FUMARATE DIHYDRATE 2 PUFF: 160; 4.5 AEROSOL RESPIRATORY (INHALATION) at 19:44

## 2020-11-29 RX ADMIN — SODIUM CHLORIDE, PRESERVATIVE FREE 10 ML: 5 INJECTION INTRAVENOUS at 08:02

## 2020-11-29 RX ADMIN — BUDESONIDE AND FORMOTEROL FUMARATE DIHYDRATE 2 PUFF: 160; 4.5 AEROSOL RESPIRATORY (INHALATION) at 07:48

## 2020-11-29 RX ADMIN — TIOTROPIUM BROMIDE INHALATION SPRAY 2 PUFF: 3.12 SPRAY, METERED RESPIRATORY (INHALATION) at 07:49

## 2020-11-29 RX ADMIN — LOSARTAN POTASSIUM 100 MG: 100 TABLET, FILM COATED ORAL at 08:02

## 2020-11-29 RX ADMIN — ATORVASTATIN CALCIUM 10 MG: 20 TABLET, FILM COATED ORAL at 08:02

## 2020-11-29 RX ADMIN — ASPIRIN 81 MG: 81 TABLET, COATED ORAL at 08:02

## 2020-11-29 RX ADMIN — ENOXAPARIN SODIUM 40 MG: 40 INJECTION SUBCUTANEOUS at 13:06

## 2020-11-30 LAB
ANION GAP SERPL CALCULATED.3IONS-SCNC: 8.4 MMOL/L (ref 5–15)
BACTERIA SPEC AEROBE CULT: ABNORMAL
BUN SERPL-MCNC: 10 MG/DL (ref 8–23)
BUN/CREAT SERPL: 15.9 (ref 7–25)
CALCIUM SPEC-SCNC: 8.3 MG/DL (ref 8.2–9.6)
CHLORIDE SERPL-SCNC: 89 MMOL/L (ref 98–107)
CO2 SERPL-SCNC: 27.6 MMOL/L (ref 22–29)
CREAT SERPL-MCNC: 0.63 MG/DL (ref 0.57–1)
GFR SERPL CREATININE-BSD FRML MDRD: 88 ML/MIN/1.73
GLUCOSE BLDC GLUCOMTR-MCNC: 101 MG/DL (ref 70–130)
GLUCOSE BLDC GLUCOMTR-MCNC: 87 MG/DL (ref 70–130)
GLUCOSE BLDC GLUCOMTR-MCNC: 92 MG/DL (ref 70–130)
GLUCOSE BLDC GLUCOMTR-MCNC: 96 MG/DL (ref 70–130)
GLUCOSE SERPL-MCNC: 92 MG/DL (ref 65–99)
GRAM STN SPEC: ABNORMAL
ISOLATED FROM: ABNORMAL
POTASSIUM SERPL-SCNC: 3.9 MMOL/L (ref 3.5–5.2)
SODIUM SERPL-SCNC: 125 MMOL/L (ref 136–145)

## 2020-11-30 PROCEDURE — 99232 SBSQ HOSP IP/OBS MODERATE 35: CPT | Performed by: INTERNAL MEDICINE

## 2020-11-30 PROCEDURE — 94799 UNLISTED PULMONARY SVC/PX: CPT

## 2020-11-30 PROCEDURE — 80048 BASIC METABOLIC PNL TOTAL CA: CPT | Performed by: INTERNAL MEDICINE

## 2020-11-30 PROCEDURE — 25010000002 FUROSEMIDE PER 20 MG: Performed by: INTERNAL MEDICINE

## 2020-11-30 PROCEDURE — 25010000002 ENOXAPARIN PER 10 MG: Performed by: INTERNAL MEDICINE

## 2020-11-30 PROCEDURE — 82962 GLUCOSE BLOOD TEST: CPT

## 2020-11-30 RX ORDER — LOPERAMIDE HYDROCHLORIDE 2 MG/1
2 CAPSULE ORAL 4 TIMES DAILY PRN
Status: DISCONTINUED | OUTPATIENT
Start: 2020-11-30 | End: 2020-12-06 | Stop reason: HOSPADM

## 2020-11-30 RX ORDER — FUROSEMIDE 10 MG/ML
40 INJECTION INTRAMUSCULAR; INTRAVENOUS ONCE
Status: COMPLETED | OUTPATIENT
Start: 2020-11-30 | End: 2020-11-30

## 2020-11-30 RX ADMIN — BUDESONIDE AND FORMOTEROL FUMARATE DIHYDRATE 2 PUFF: 160; 4.5 AEROSOL RESPIRATORY (INHALATION) at 08:01

## 2020-11-30 RX ADMIN — ATORVASTATIN CALCIUM 10 MG: 20 TABLET, FILM COATED ORAL at 08:42

## 2020-11-30 RX ADMIN — METOPROLOL TARTRATE 12.5 MG: 25 TABLET, FILM COATED ORAL at 20:07

## 2020-11-30 RX ADMIN — ENOXAPARIN SODIUM 40 MG: 40 INJECTION SUBCUTANEOUS at 12:19

## 2020-11-30 RX ADMIN — LOPERAMIDE HYDROCHLORIDE 2 MG: 2 CAPSULE ORAL at 20:06

## 2020-11-30 RX ADMIN — GABAPENTIN 300 MG: 300 CAPSULE ORAL at 08:42

## 2020-11-30 RX ADMIN — METOPROLOL TARTRATE 12.5 MG: 25 TABLET, FILM COATED ORAL at 10:21

## 2020-11-30 RX ADMIN — BUDESONIDE AND FORMOTEROL FUMARATE DIHYDRATE 2 PUFF: 160; 4.5 AEROSOL RESPIRATORY (INHALATION) at 19:33

## 2020-11-30 RX ADMIN — TIOTROPIUM BROMIDE INHALATION SPRAY 2 PUFF: 3.12 SPRAY, METERED RESPIRATORY (INHALATION) at 08:03

## 2020-11-30 RX ADMIN — SODIUM CHLORIDE, PRESERVATIVE FREE 10 ML: 5 INJECTION INTRAVENOUS at 21:55

## 2020-11-30 RX ADMIN — GABAPENTIN 300 MG: 300 CAPSULE ORAL at 20:06

## 2020-11-30 RX ADMIN — FUROSEMIDE 40 MG: 10 INJECTION, SOLUTION INTRAMUSCULAR; INTRAVENOUS at 12:19

## 2020-11-30 RX ADMIN — ASPIRIN 81 MG: 81 TABLET, COATED ORAL at 08:42

## 2020-12-01 LAB
ANION GAP SERPL CALCULATED.3IONS-SCNC: 8.7 MMOL/L (ref 5–15)
BUN SERPL-MCNC: 10 MG/DL (ref 8–23)
BUN/CREAT SERPL: 17.2 (ref 7–25)
CALCIUM SPEC-SCNC: 8.4 MG/DL (ref 8.2–9.6)
CHLORIDE SERPL-SCNC: 89 MMOL/L (ref 98–107)
CO2 SERPL-SCNC: 28.3 MMOL/L (ref 22–29)
CREAT SERPL-MCNC: 0.58 MG/DL (ref 0.57–1)
GFR SERPL CREATININE-BSD FRML MDRD: 97 ML/MIN/1.73
GLUCOSE BLDC GLUCOMTR-MCNC: 108 MG/DL (ref 70–130)
GLUCOSE BLDC GLUCOMTR-MCNC: 109 MG/DL (ref 70–130)
GLUCOSE BLDC GLUCOMTR-MCNC: 157 MG/DL (ref 70–130)
GLUCOSE BLDC GLUCOMTR-MCNC: 93 MG/DL (ref 70–130)
GLUCOSE SERPL-MCNC: 90 MG/DL (ref 65–99)
POTASSIUM SERPL-SCNC: 4.1 MMOL/L (ref 3.5–5.2)
SODIUM SERPL-SCNC: 126 MMOL/L (ref 136–145)

## 2020-12-01 PROCEDURE — 94799 UNLISTED PULMONARY SVC/PX: CPT

## 2020-12-01 PROCEDURE — 80048 BASIC METABOLIC PNL TOTAL CA: CPT | Performed by: INTERNAL MEDICINE

## 2020-12-01 PROCEDURE — 25010000002 ENOXAPARIN PER 10 MG: Performed by: INTERNAL MEDICINE

## 2020-12-01 PROCEDURE — 97530 THERAPEUTIC ACTIVITIES: CPT

## 2020-12-01 PROCEDURE — 97162 PT EVAL MOD COMPLEX 30 MIN: CPT

## 2020-12-01 PROCEDURE — 82962 GLUCOSE BLOOD TEST: CPT

## 2020-12-01 PROCEDURE — 99232 SBSQ HOSP IP/OBS MODERATE 35: CPT | Performed by: INTERNAL MEDICINE

## 2020-12-01 RX ADMIN — GABAPENTIN 300 MG: 300 CAPSULE ORAL at 20:10

## 2020-12-01 RX ADMIN — SODIUM CHLORIDE, PRESERVATIVE FREE 10 ML: 5 INJECTION INTRAVENOUS at 20:15

## 2020-12-01 RX ADMIN — SODIUM CHLORIDE, PRESERVATIVE FREE 10 ML: 5 INJECTION INTRAVENOUS at 08:58

## 2020-12-01 RX ADMIN — ATORVASTATIN CALCIUM 10 MG: 20 TABLET, FILM COATED ORAL at 08:56

## 2020-12-01 RX ADMIN — METOPROLOL TARTRATE 12.5 MG: 25 TABLET, FILM COATED ORAL at 20:10

## 2020-12-01 RX ADMIN — ACETAMINOPHEN 650 MG: 325 TABLET, FILM COATED ORAL at 04:56

## 2020-12-01 RX ADMIN — ASPIRIN 81 MG: 81 TABLET, COATED ORAL at 08:57

## 2020-12-01 RX ADMIN — METOPROLOL TARTRATE 12.5 MG: 25 TABLET, FILM COATED ORAL at 08:57

## 2020-12-01 RX ADMIN — BUDESONIDE AND FORMOTEROL FUMARATE DIHYDRATE 2 PUFF: 160; 4.5 AEROSOL RESPIRATORY (INHALATION) at 08:14

## 2020-12-01 RX ADMIN — ENOXAPARIN SODIUM 40 MG: 40 INJECTION SUBCUTANEOUS at 11:43

## 2020-12-01 RX ADMIN — GABAPENTIN 300 MG: 300 CAPSULE ORAL at 08:57

## 2020-12-01 RX ADMIN — TIOTROPIUM BROMIDE INHALATION SPRAY 2 PUFF: 3.12 SPRAY, METERED RESPIRATORY (INHALATION) at 08:15

## 2020-12-01 NOTE — DISCHARGE PLACEMENT REQUEST
"Christine Bernstein (89 y.o. Female)     Date of Birth Social Security Number Address Home Phone MRN    07/27/1925  [address]  [facility] [address] [phone] [id number]    Sabianism Marital Status          Caodaism        Admission Date Admission Type Admitting Provider Attending Provider Department, Room/Bed    11/28/20 Emergency Carrington Olivares MD Kellie, Scott P, MD [facility] INTENSIVE CARE, I377/1    Discharge Date Discharge Disposition Discharge Destination                       Attending Provider: Carrington Olivares MD    Allergies: No Known Allergies    Isolation: None   Infection: None   Code Status: No CPR    Ht: 167 cm (65.75\")   Wt: 67 kg (147 lb 11.3 oz)    Admission Cmt: None   Principal Problem: None                Active Insurance as of 11/28/2020     Primary Coverage     Payor Plan Insurance Group Employer/Plan Group    MEDICARE MEDICARE A & B      Payor Plan Address Payor Plan Phone Number Payor Plan Fax Number Effective Dates    [address] [phone]  8/1/1990 - None Entered    [facility] [id number]       Subscriber Name Subscriber Birth Date Member ID       CHRISTINE BERNSTEIN 7/27/1925 [id number]           Secondary Coverage     Payor Plan Insurance Group Employer/Plan Group    [employer] LIFE [employer] CONSTITUTION LIFE INSURANCE [id number]     Payor Plan Address Payor Plan Phone Number Payor Plan Fax Number Effective Dates    PO  [phone]  10/1/1992 - None Entered    [facility] [id number]       Subscriber Name Subscriber Birth Date Member ID       CHRISTINE BERNSTEIN 7/27/1925 [id number]                 Emergency Contacts      (Rel.) Home Phone Work Phone Mobile Phone    NicolaChaparro (Son) -- -- [phone]    NicolaJoey (Son) -- -- [phone]              "

## 2020-12-01 NOTE — THERAPY EVALUATION
Patient Name: Christine Petersen  : 1925    MRN: [id number]                              Today's Date: 2020       Admit Date: 2020    Visit Dx:     ICD-10-CM ICD-9-CM   1. Acute respiratory failure with hypoxia and hypercapnia (CMS/HCC)  J96.01 518.81    J96.02    2. Acute congestive heart failure, unspecified heart failure type (CMS/HCC)  I50.9 428.0     Patient Active Problem List   Diagnosis   • Mild aortic stenosis   • Chronic diastolic heart failure (CMS/HCC)   • COPD (chronic obstructive pulmonary disease) (CMS/HCC)   • Acute respiratory failure with hypoxia and hypercapnia (CMS/HCC)     Past Medical History:   Diagnosis Date   • Abdominal aortic aneurysm (CMS/HCC)    • Aortic stenosis    • Congestive heart disease (CMS/HCC)    • COPD (chronic obstructive pulmonary disease) (CMS/HCC)    • Diastolic CHF, chronic (CMS/HCC)    • Malignant neoplasm of lung (CMS/HCC)      History reviewed. No pertinent surgical history.  General Information     Row Name 20 [facility]8          Physical Therapy Time and Intention    Document Type  evaluation  -     Mode of Treatment  individual therapy;physical therapy  -     Row Name 20 1208          General Information    Prior Level of Function  independent:;gait;transfer;bed mobility no AD at baseline but has walker at home if needed  -     Existing Precautions/Restrictions  fall  -     Barriers to Rehab  medically complex  -     Row Name 20 1208          Living Environment    Lives With  child(stephon), adult  -     Row Name 20 1208          Cognition    Orientation Status (Cognition)  oriented x 3 pt seems a bit forgetful at time as she asked the same question multiple times  -     Row Name 20 1208          Safety Issues, Functional Mobility    Impairments Affecting Function (Mobility)  balance;endurance/activity tolerance;strength  -       User Key  (r) = Recorded By, (t) = Taken By, (c) = Cosigned By    Initials Name Provider  Type    CH Jocelyne Berry, PT Physical Therapist        Mobility     Row Name 12/01/20 1210          Bed Mobility    Bed Mobility  supine-sit;sit-supine  -     Supine-Sit [place] (Bed Mobility)  verbal cues;nonverbal cues (demo/gesture);minimum assist (75% patient effort)  -     Sit-Supine [place] (Bed Mobility)  verbal cues;nonverbal cues (demo/gesture);contact guard  -     Row Name 12/01/20 1210          Sit-Stand Transfer    Sit-Stand [place] (Transfers)  verbal cues;nonverbal cues (demo/gesture);minimum assist (75% patient effort)  -     Assistive Device (Sit-Stand Transfers)  -- HHA  -     Row Name 12/01/20 1210          Gait/Stairs (Locomotion)    [place] Level (Gait)  verbal cues;nonverbal cues (demo/gesture);contact guard;minimum assist (75% patient effort)  -     Assistive Device (Gait)  walker, front-wheeled pt walked 10ft in room with HHA and Tejal, the remaining 50 ft was with a walker and [facility]  -     Distance in Feet (Gait)  60  -CH     Deviations/Abnormal Patterns (Gait)  anaya decreased;gait speed decreased;stride length decreased  -     Bilateral Gait Deviations  forward flexed posture  -       User Key  (r) = Recorded By, (t) = Taken By, (c) = Cosigned By    Initials Name Provider Type    Jocelyne Albarado PT Physical Therapist        Obj/Interventions     Row Name 12/01/20 1212          Range of Motion Comprehensive    General Range of Motion  no range of motion deficits identified  -     Row Name 12/01/20 1212          Strength Comprehensive (MMT)    Comment, General Manual Muscle Testing (MMT) Assessment  genralized weakness noted with functional mobility, B LE grossly >/=3+/5  -     Row Name 12/01/20 1212          Balance    Balance Assessment  standing static balance;standing dynamic balance  -     Static Standing Balance  mild impairment  -     Dynamic Standing Balance  mild impairment  -       User Key  (r) = Recorded By, (t) = Taken  By, (c) = Cosigned By    Initials Name Provider Type     Jocelyne Berry, PT Physical Therapist        Goals/Plan     Row Name 12/01/20 1215          Bed Mobility Goal 1 (PT)    Activity/Assistive Device (Bed Mobility Goal 1, PT)  bed mobility activities, all  -CH     [place] Level/Cues Needed (Bed Mobility Goal 1, PT)  supervision required  -CH     Time Frame (Bed Mobility Goal 1, PT)  1 week  -     Row Name 12/01/20 1215          Transfer Goal 1 (PT)    Activity/Assistive Device (Transfer Goal 1, PT)  transfers, all;walker, rolling  -CH     [place] Level/Cues Needed (Transfer Goal 1, PT)  supervision required  -CH     Time Frame (Transfer Goal 1, PT)  1 week  -     Row Name 12/01/20 1215          Gait Training Goal 1 (PT)    Activity/Assistive Device (Gait Training Goal 1, PT)  gait (walking locomotion);walker, rolling  -CH     [place] Level (Gait Training Goal 1, PT)  supervision required  -CH     Distance (Gait Training Goal 1, PT)  150  -CH     Time Frame (Gait Training Goal 1, PT)  1 week  -       User Key  (r) = Recorded By, (t) = Taken By, (c) = Cosigned By    Initials Name Provider Type     Jocelyne Berry, PT Physical Therapist        Clinical Impression     Row Name 12/01/20 1213          Pain    Additional Documentation  Pain Scale: FACES Pre/Post-Treatment (Group)  -     Row Name 12/01/20 1213          Pain Scale: FACES Pre/Post-Treatment    Pain: FACES Scale, Pretreatment  0-->no hurt  -     Posttreatment Pain Rating  0-->no hurt  -     Row Name 12/01/20 1213          Plan of Care Review    Plan of Care Reviewed With  patient  -     Outcome Summary  Pt is a 89 yo F who was admtited [facility] SOA, acute respiratory failure, and CHF. Pt presents to PT with some impaired functioanl mobility and gait secondary to some generalized weakness, impaired balance, and decreased activity tolerance. Pt may benefit from skilled PT to address strength, mobility, and gait.  -     Will  Name 12/01/20 1213          Therapy Assessment/Plan (PT)    Patient/Family Therapy Goals Statement (PT)  to go home  -     Rehab Potential (PT)  good, to achieve stated therapy goals  -     Criteria for Skilled Interventions Met (PT)  skilled treatment is necessary  -     Row Name 12/01/20 1213          Positioning and Restraints    Pre-Treatment Position  in bed  -     Post Treatment Position  bed  -CH     In Bed  supine;sitting EOB;call light within reach;encouraged to call for assist;exit alarm on  -       User Key  (r) = Recorded By, (t) = Taken By, (c) = Cosigned By    Initials Name Provider Type     Jocelyne Berry, PT Physical Therapist        Outcome Measures     Row Name 12/01/20 1216          How much help from another person do you currently need...    Turning from your back to your side while in flat bed without using bedrails?  3  -CH     Moving from lying on back to sitting on the side of a flat bed without bedrails?  3  -CH     Moving to and from a bed to a chair (including a wheelchair)?  3  -CH     Standing up from a chair using your arms (e.g., wheelchair, bedside chair)?  3  -CH     Climbing 3-5 steps with a railing?  2  -CH     To walk in hospital room?  3  -CH     AM-PAC 6 Clicks Score (PT)  17  -     Row Name 12/01/20 1216          Functional Assessment    Outcome Measure Options  AM-PAC 6 Clicks Basic Mobility (PT)  -       User Key  (r) = Recorded By, (t) = Taken By, (c) = Cosigned By    Initials Name Provider Type    Jocelyne Albarado, PT Physical Therapist        Physical Therapy Education                 Title: PT OT SLP Therapies (Done)     Topic: Physical Therapy (Done)     Point: Mobility training (Done)     Learning Progress Summary           Patient Acceptance, E,TB,D, VU,NR by  at 12/1/2020 1217                   Point: Home exercise program (Done)     Learning Progress Summary           Patient Acceptance, E,TB,D, VU,NR by  at 12/1/2020 1217                    Point: Body mechanics (Done)     Learning Progress Summary           Patient Acceptance, E,TB,D, VU,NR by  at 12/1/2020 1217                   Point: Precautions (Done)     Learning Progress Summary           Patient Acceptance, E,TB,D, VU,NR by  at 12/1/2020 1217                               User Key     Initials Effective Dates Name Provider Type Discipline     04/03/18 -  Jocelyne Berry PT Physical Therapist PT              PT Recommendation and Plan  Planned Therapy Interventions (PT): balance training, bed mobility training, gait training, home exercise program, patient/family education, strengthening, transfer training  Plan of Care Reviewed With: patient  Outcome Summary: Pt is a 89 yo F who was admtited wth SOA, acute respiratory failure, and CHF. Pt presents to PT with some impaired functioanl mobility and gait secondary to some generalized weakness, impaired balance, and decreased activity tolerance. Pt may benefit from skilled PT to address strength, mobility, and gait.     Time Calculation:   PT Charges     Row Name 12/01/20 1218             Time Calculation    Start Time  1117  -      Stop Time  1137  -      Time Calculation (min)  20 min  -      PT Received On  12/01/20  -      PT - Next Appointment  12/02/20  -      PT Goal Re-Cert Due Date  12/08/20  -         Time Calculation- PT    Total Timed Code Minutes- PT  12 minute(s)  -        User Key  (r) = Recorded By, (t) = Taken By, (c) = Cosigned By    Initials Name Provider Type     Jocelyne Berry PT Physical Therapist        Therapy Charges for Today     Code Description Service Date Service Provider Modifiers Qty    55127616269  PT EVAL MOD COMPLEXITY 2 12/1/2020 Jocelyne Berry, PT GP 1    46752831906  PT THERAPEUTIC ACT EA 15 MIN 12/1/2020 Jocelyne Berry, PT GP 1    [id number]  PT THER SUPP EA 15 MIN 12/1/2020 Jocelyne Berry, PT GP 1          PT G-Codes  Outcome Measure Options: AM-PAC 6 Clicks  Basic Mobility (PT)  AM-PAC 6 Clicks Score (PT): 17    Jocelyne Berry, PT  12/1/2020

## 2020-12-01 NOTE — PROGRESS NOTES
Continued Stay Note  [facility]     Patient Name: Christine Petersen  MRN: [id number]  Today's Date: 12/1/2020    Admit Date: 11/28/2020    Discharge Plan     Row Name 12/01/20 1244       Plan    Plan Comments  [facility]    Row Name 12/01/20 1219       Plan    Plan  Home.  Pt denies needs    Plan Comments  IMM noted.  CCP met with patient at bedside.  CCP role explained and discharge planning discussed. Face sheet verified. Patient’s PCP is Dr. Demetris Kennedy.   Her emergency contact is her son Chaparro, [phone].  Pt lives with her son Joey in a single story house with three steps to enter.   Patient uses no DME to ambulate. Patient is independent with some ADL’s. Pt has no past history of HH or rehab stays.   Pt obtains her medications from [facility] pharmacy on [address].  Plan is home with no needs  CCP following        Discharge Codes    No documentation.             Stephania Saab RN     PATIENT TO ULTRASOUND VIA STRETCHER, URINE CUP PROVIDED TO PATIENT

## 2020-12-01 NOTE — NURSING NOTE
Spoke with [place] in cardiology to verify ok to discharge patient. Ok for discharge. Dr Olivares made aware

## 2020-12-01 NOTE — PROGRESS NOTES
[facility] Follow Up    Chief Complaint: Follow up CHF    Interval History: Reports she is feeling better.  She feels like her breathing is baseline at rest.  She was on room air yesterday was placed back on oxygen after she developed mild hypoxia while asleep.  She is on 1 L nasal cannula this morning.  She denies any chest pain.    Objective:     Objective:  Temp:  [98 °F (36.7 °C)-98.6 °F (37 °C)] 98.2 °F (36.8 °C)  Heart Rate:  [66-93] 67  Resp:  [18-22] 18  BP: ()/(44-88) 105/44     Intake/Output Summary (Last 24 hours) at 12/1/2020 0809  Last data filed at 12/1/2020 0500  Gross per 24 hour   Intake 860 ml   Output 815 ml   Net 45 ml     Body mass index is 24.02 kg/m².      11/28/20  0846 11/28/20  1100 11/28/20  1417   Weight: 77.6 kg (171 lb) 67.4 kg (148 lb 9.4 oz) 67 kg (147 lb 11.3 oz)     Weight change:       Physical Exam:   General : Alert, cooperative, in no acute distress.  Neuro: Alert,cooperative and oriented.  Lungs: CTAB. Normal respiratory effort and rate.  CV: Regular rate and rhythm, normal S1 and S2, no murmurs, gallops or rubs.  ABD: Soft, nontender, nondistended. Positive bowel sounds.  Extr: No edema or cyanosis, moves all extremities.    Lab Review:   Results from last 7 days   Lab Units 11/30/20  1031 11/29/20  0913 11/28/20  0838   SODIUM mmol/L 125* 129* 130*   POTASSIUM mmol/L 3.9 4.0 4.3   CHLORIDE mmol/L 89* 91* 95*   CO2 mmol/L 27.6 26.3 23.9   BUN mg/dL 10 13 9   CREATININE mg/dL 0.63 0.73 0.83   GLUCOSE mg/dL 92 106* 266*   CALCIUM mg/dL 8.3 8.4 8.8   AST (SGOT) U/L  --  19 19   ALT (SGPT) U/L  --  9 10     Results from last 7 days   Lab Units 11/29/20  0019 11/28/20  1752 11/28/20  1214 11/28/20  0838   TROPONIN T ng/mL 0.021 0.031* 0.036* <0.010     Results from last 7 days   Lab Units 11/29/20  0829 11/28/20  0838   WBC 10*3/mm3 6.69 7.18   HEMOGLOBIN g/dL 12.4 14.1   HEMATOCRIT % 37.5 44.4   PLATELETS 10*3/mm3 226 294                   Invalid  input(s): LDLCALC  Results from last 7 days   Lab Units 11/28/20  0838   PROBNP pg/mL 4,551.0*         I reviewed the patient's new clinical results.  I personally viewed and interpreted the patient's EKG  Current Medications:   Scheduled Meds:aspirin, 81 mg, Oral, Daily  atorvastatin, 10 mg, Oral, Daily  budesonide-formoterol, 2 puff, Inhalation, BID - RT  enoxaparin, 40 mg, Subcutaneous, Q24H  gabapentin, 300 mg, Oral, Q12H  insulin lispro, 0-7 Units, Subcutaneous, TID AC  metoprolol tartrate, 12.5 mg, Oral, Q12H  sodium chloride, 10 mL, Intravenous, Q12H  tiotropium bromide monohydrate, 2 puff, Inhalation, Daily - RT      Continuous Infusions:     Allergies:  No Known Allergies    Assessment/Plan:     1. Acute systolic and diastolic congestive heart failure.  Improved.  Currently off of any diuretic therapy.  2. Acute hypoxic respiratory failure.  Improved.  3. Cardiomyopathy. EF of 25%.   4. Moderate aortic stenosis  5. Moderate mitral regurgitation  6. Sick sinus syndrome status post dual chamber pacemaker. In 3/2020.  7. COPD  8.  Chronic hyponatremia.    -Agree with transfer to the floor.  -If she continues to remain stable I had be okay with discharge at any point.  She will need to follow-up with [place] [facility] in 2 to 4 weeks.    Mily Trujillo MD  12/01/20  08:09 EST

## 2020-12-01 NOTE — PLAN OF CARE
Goal Outcome Evaluation:  Plan of Care Reviewed With: patient     Outcome Summary: Pt is a 89 yo F who was admtited wth SOA, acute respiratory failure, and CHF. Pt presents to PT with some impaired functioanl mobility and gait secondary to some generalized weakness, impaired balance, and decreased activity tolerance. Pt may benefit from skilled PT to address strength, mobility, and gait.Patient was intermittently wearing a face mask during this therapy encounter. Therapist used appropriate personal protective equipment including eye protection, mask, and gloves.  Mask used was standard procedure mask. Appropriate PPE was worn during the entire therapy session. Hand hygiene was completed before and after therapy session. Patient is not in enhanced droplet precautions.

## 2020-12-01 NOTE — PROGRESS NOTES
Discharge Planning Assessment  [facility]     Patient Name: Christine Petersen  MRN: [id number]  Today's Date: 12/1/2020    Admit Date: 11/28/2020    Discharge Needs Assessment     Row Name 12/01/20 1223       Living Environment    Lives With  child(stephon), adult    Current Living Arrangements  home/apartment/condo    Potentially Unsafe Housing Conditions  unable to assess    Primary Care Provided by  self;child(stephon)    Provides Primary Care For  no one    Family Caregiver if Needed  child(stephon), adult    Able to Return to Prior Arrangements  yes       Resource/Environmental Concerns    Resource/Environmental Concerns  none    Transportation Concerns  car, none       Transition Planning    Patient/Family Anticipates Transition to  home with family    Patient/Family Anticipated Services at Transition  none    Transportation Anticipated  family or friend will provide       Discharge Needs Assessment    Equipment Currently Used at Home  none    Concerns to be Addressed  no discharge needs identified    Anticipated Changes Related to Illness  none    Equipment Needed After Discharge  none    Provided Post Acute Provider List?  N/A    Provided Post Acute Provider Quality & Resource List?  N/A        Discharge Plan     Row Name 12/01/20 1219       Plan    Plan  Home.  Pt denies needs    Plan Comments  IMM noted.  CCP met with patient at bedside.  CCP role explained and discharge planning discussed. Face sheet verified. Patient’s PCP is Dr. Demetris Kennedy.   Her emergency contact is her son Chaparro, [phone].  Pt lives with her son Joey in a single story house with three steps to enter.   Patient uses no DME to ambulate. Patient is independent with some ADL’s. Pt has no past history of HH or rehab stays.   Pt obtains her medications from [facility] on [address].  Plan is home with no needs  CCP following        Continued Care and Services - Admitted Since 11/28/2020    Coordination has not been started for this  encounter.         Demographic Summary    No documentation.       Functional Status    No documentation.       Psychosocial    No documentation.       Abuse/Neglect    No documentation.       Legal    No documentation.       Substance Abuse    No documentation.       Patient Forms    No documentation.           Stephania Saab RN

## 2020-12-01 NOTE — NURSING NOTE
Patient having low urine output throughout the day, bladder scanned for >600, and >800 after patient ambulating and voiding in the bathroom. Attempt to straight cath patient unsuccessful x2 and so received order for knight catheter placement per Dr Olivares. Patient family members notified of her urinary retention, verbalized understanding that she was not going to be discharged today.    patient

## 2020-12-02 LAB
GLUCOSE BLDC GLUCOMTR-MCNC: 103 MG/DL (ref 70–130)
GLUCOSE BLDC GLUCOMTR-MCNC: 105 MG/DL (ref 70–130)
GLUCOSE BLDC GLUCOMTR-MCNC: 111 MG/DL (ref 70–130)
GLUCOSE BLDC GLUCOMTR-MCNC: 94 MG/DL (ref 70–130)
OSMOLALITY UR: 172 MOSM/KG

## 2020-12-02 PROCEDURE — 82570 ASSAY OF URINE CREATININE: CPT | Performed by: INTERNAL MEDICINE

## 2020-12-02 PROCEDURE — 83935 ASSAY OF URINE OSMOLALITY: CPT | Performed by: INTERNAL MEDICINE

## 2020-12-02 PROCEDURE — 25010000002 ENOXAPARIN PER 10 MG: Performed by: INTERNAL MEDICINE

## 2020-12-02 PROCEDURE — 94799 UNLISTED PULMONARY SVC/PX: CPT

## 2020-12-02 PROCEDURE — 97110 THERAPEUTIC EXERCISES: CPT

## 2020-12-02 PROCEDURE — 82962 GLUCOSE BLOOD TEST: CPT

## 2020-12-02 PROCEDURE — 84300 ASSAY OF URINE SODIUM: CPT | Performed by: INTERNAL MEDICINE

## 2020-12-02 RX ADMIN — GABAPENTIN 300 MG: 300 CAPSULE ORAL at 09:33

## 2020-12-02 RX ADMIN — TIOTROPIUM BROMIDE INHALATION SPRAY 2 PUFF: 3.12 SPRAY, METERED RESPIRATORY (INHALATION) at 10:55

## 2020-12-02 RX ADMIN — SODIUM CHLORIDE, PRESERVATIVE FREE 10 ML: 5 INJECTION INTRAVENOUS at 20:33

## 2020-12-02 RX ADMIN — METOPROLOL TARTRATE 12.5 MG: 25 TABLET, FILM COATED ORAL at 09:33

## 2020-12-02 RX ADMIN — SODIUM CHLORIDE, PRESERVATIVE FREE 10 ML: 5 INJECTION INTRAVENOUS at 09:35

## 2020-12-02 RX ADMIN — ATORVASTATIN CALCIUM 10 MG: 20 TABLET, FILM COATED ORAL at 09:32

## 2020-12-02 RX ADMIN — ACETAMINOPHEN 325 MG: 325 TABLET, FILM COATED ORAL at 13:03

## 2020-12-02 RX ADMIN — ASPIRIN 81 MG: 81 TABLET, COATED ORAL at 09:33

## 2020-12-02 RX ADMIN — BUDESONIDE AND FORMOTEROL FUMARATE DIHYDRATE 2 PUFF: 160; 4.5 AEROSOL RESPIRATORY (INHALATION) at 21:42

## 2020-12-02 RX ADMIN — ACETAMINOPHEN 650 MG: 325 TABLET, FILM COATED ORAL at 09:33

## 2020-12-02 RX ADMIN — BUDESONIDE AND FORMOTEROL FUMARATE DIHYDRATE 2 PUFF: 160; 4.5 AEROSOL RESPIRATORY (INHALATION) at 09:02

## 2020-12-02 RX ADMIN — METOPROLOL TARTRATE 12.5 MG: 25 TABLET, FILM COATED ORAL at 20:33

## 2020-12-02 RX ADMIN — GABAPENTIN 300 MG: 300 CAPSULE ORAL at 20:33

## 2020-12-02 RX ADMIN — ENOXAPARIN SODIUM 40 MG: 40 INJECTION SUBCUTANEOUS at 11:28

## 2020-12-02 NOTE — PROGRESS NOTES
[facility]      Mar/chart reviewed  Follow up acute hypoxemic respiratory failure  No chest pain  Had issues with urinary retention last night    Vital Sign Min/Max for last 24 hours  Temp  Min: 97.2 °F (36.2 °C)  Max: 98.8 °F (37.1 °C)   BP  Min: 98/52  Max: 139/76   Pulse  Min: 67  Max: 94   Resp  Min: 18  Max: 18   SpO2  Min: 91 %  Max: 99 %   Flow (L/min)  Min: 1  Max: 2.5   Weight  Min: 60 kg (132 lb 4.4 oz)  Max: 60 kg (132 lb 4.4 oz)   530/2850    Appears ill, axox3,   perrl, eomi, no icterus,  mmm, no jvd, trachea midline, neck supple,  chest fair ae bilaterally, no crackles, no wheezes,   rrr,   soft, nt, nd +bs,  no c/c/ trace edema  Skin warm, dry no rashes    Labs: 12/2: reviewed:  Glucose 90  Bun 10  Cr 0.58  Na 126  Bicarb 28    A/P:  1. Acute hypoxemic and hypercapnic respiratory failure -- wean oxygen as able  2. Acute systolic chf -- better today,medications per cardiology  3. Hyponatremia --  monitor  4. COPD - continue bronchodilators  5. Hyperglycemia -- SSI  6. HTN -- continue home meds  7. HLD -- conitnue home meds  8. Dnr/dni  9. Elevated troponin  10. Valvular heart disease --moderate aortic valve stenosis, mod mitral valve stenosis  11. Diarrhea -- prn immodium  12. Urinary retention    Persistent hyponatremia, urinary retention  Home soon hopefully

## 2020-12-02 NOTE — PLAN OF CARE
Goal Outcome Evaluation:  Plan of Care Reviewed With: patient  A fib rate at times 110-140's. Scheduled meds given and prn metoprolol. Speech saw and updated diet. K replaced. Prn lortab for muscular left chest pain. Pt feels like he pulled a muscle a few days ago in bed.dr. pérez with surgery saw and noted. Safety maintained. Will continue to monitor.

## 2020-12-02 NOTE — THERAPY TREATMENT NOTE
Patient Name: Christine Petersen  : 1925    MRN: [id number]                              Today's Date: 2020       Admit Date: 2020    Visit Dx:     ICD-10-CM ICD-9-CM   1. Acute respiratory failure with hypoxia and hypercapnia (CMS/HCC)  J96.01 518.81    J96.02    2. Acute congestive heart failure, unspecified heart failure type (CMS/HCC)  I50.9 428.0     Patient Active Problem List   Diagnosis   • Mild aortic stenosis   • Chronic diastolic heart failure (CMS/HCC)   • COPD (chronic obstructive pulmonary disease) (CMS/HCC)   • Acute respiratory failure with hypoxia and hypercapnia (CMS/HCC)     Past Medical History:   Diagnosis Date   • Abdominal aortic aneurysm (CMS/HCC)    • Aortic stenosis    • Congestive heart disease (CMS/HCC)    • COPD (chronic obstructive pulmonary disease) (CMS/HCC)    • Diastolic CHF, chronic (CMS/HCC)    • Malignant neoplasm of lung (CMS/HCC)      History reviewed. No pertinent surgical history.  General Information     Row Name 20 1154          Physical Therapy Time and Intention    Document Type  therapy note (daily note)  -     Mode of Treatment  individual therapy;physical therapy  -     Row Name 20 1154          General Information    Existing Precautions/Restrictions  fall  -     Row Name 20 1154          Safety Issues, Functional Mobility    Impairments Affecting Function (Mobility)  balance;endurance/activity tolerance;strength  -       User Key  (r) = Recorded By, (t) = Taken By, (c) = Cosigned By    Initials Name Provider Type     Jocelyne Berry PT Physical Therapist        Mobility     Row Name 20 1154          Bed Mobility    Bed Mobility  supine-sit;sit-supine  -     Supine-Sit [place] (Bed Mobility)  verbal cues;nonverbal cues (demo/gesture);contact guard  -     Sit-Supine [place] (Bed Mobility)  verbal cues;nonverbal cues (demo/gesture);contact guard  -     Row Name 20 1154          Transfers     Comment (Transfers)  pt with LOB posteriorly with intial standing, required Eri to correct  -     Row Name 12/02/20 1154          Sit-Stand Transfer    Sit-Stand [place] (Transfers)  verbal cues;nonverbal cues (demo/gesture);minimum assist (75% patient effort)  -     Assistive Device (Sit-Stand Transfers)  walker, front-wheeled  -     Row Name 12/02/20 1154          Gait/Stairs (Locomotion)    [place] Level (Gait)  verbal cues;nonverbal cues (demo/gesture);minimum assist (75% patient effort)  -     Assistive Device (Gait)  walker, front-wheeled  -     Distance in Feet (Gait)  100  -     Deviations/Abnormal Patterns (Gait)  anaya decreased;gait speed decreased;stride length decreased  -     Bilateral Gait Deviations  forward flexed posture  -       User Key  (r) = Recorded By, (t) = Taken By, (c) = Cosigned By    Initials Name Provider Type     Jocelyne Berry PT Physical Therapist        Obj/Interventions     [facility] Name 12/02/20 1155          Motor Skills    Therapeutic Exercise  -- 10 reps AP and LAQ  -       User Key  (r) = Recorded By, (t) = Taken By, (c) = Cosigned By    Initials Name Provider Type     Jocelyne Berry, PT Physical Therapist        Goals/Plan    No documentation.       Clinical Impression     [facility] Name 12/02/20 1156          Pain    Additional Documentation  Pain Scale: FACES Pre/Post-Treatment (Group)  -[facility] Name 12/02/20 1156          Pain Scale: FACES Pre/Post-Treatment    Pain: FACES Scale, Pretreatment  0-->no hurt  -     Posttreatment Pain Rating  0-->no hurt  -[facility] Name 12/02/20 1156          Plan of Care Review    Plan of Care Reviewed With  patient  -     Outcome Summary  Pt demonstrates some increased activity tolerance and functional strength as she was able to increase her gait distance and required slightly less assistance. Pt continues to have some impaired balance with initial stand which required some assistance to correct. PT  will continue to follow to address strength, mobility, and gait.  -     Row Name 12/02/20 1156          Positioning and Restraints    Pre-Treatment Position  in bed  -CH     Post Treatment Position  bed  -CH     In Bed  supine;call light within reach;encouraged to call for assist;exit alarm on;with nsg aide present upon PT's departure  -       User Key  (r) = Recorded By, (t) = Taken By, (c) = Cosigned By    Initials Name Provider Type    Jocelyne Albarado PT Physical Therapist        Outcome Measures     Row Name 12/02/20 1157          How much help from another person do you currently need...    Turning from your back to your side while in flat bed without using bedrails?  3  -CH     Moving from lying on back to sitting on the side of a flat bed without bedrails?  3  -CH     Moving to and from a bed to a chair (including a wheelchair)?  3  -CH     Standing up from a chair using your arms (e.g., wheelchair, bedside chair)?  3  -CH     Climbing 3-5 steps with a railing?  2  -CH     To walk in hospital room?  3  -CH     AM-PAC 6 Clicks Score (PT)  17  -     Row Name 12/02/20 1157          Functional Assessment    Outcome Measure Options  AM-PAC 6 Clicks Basic Mobility (PT)  -       User Key  (r) = Recorded By, (t) = Taken By, (c) = Cosigned By    Initials Name Provider Type    Jocelyne Albarado PT Physical Therapist        Physical Therapy Education                 Title: PT OT SLP Therapies (Done)     Topic: Physical Therapy (Done)     Point: Mobility training (Done)     Learning Progress Summary           Patient Acceptance, E,TB,D, VU,NR by  at 12/2/2020 1159    Acceptance, E,TB,D, VU,NR by  at 12/1/2020 1217                   Point: Home exercise program (Done)     Learning Progress Summary           Patient Acceptance, E,TB,D, VU,NR by  at 12/2/2020 1159    Acceptance, E,TB,D, VU,NR by  at 12/1/2020 1217                   Point: Body mechanics (Done)     Learning Progress Summary            Patient Acceptance, E,TB,D, VU,NR by  at 12/2/2020 1159    Acceptance, E,TB,D, VU,NR by  at 12/1/2020 1217                   Point: Precautions (Done)     Learning Progress Summary           Patient Acceptance, E,TB,D, VU,NR by  at 12/2/2020 1159    Acceptance, E,TB,D, VU,NR by  at 12/1/2020 1217                               User Key     Initials Effective Dates Name Provider Type [facility] 04/03/18 -  Jocelyne Berry PT Physical Therapist PT              PT Recommendation and Plan  Planned Therapy Interventions (PT): balance training, bed mobility training, gait training, home exercise program, patient/family education, strengthening, transfer training  Plan of Care Reviewed With: patient  Outcome Summary: Pt demonstrates some increased activity tolerance and functional strength as she was able to increase her gait distance and required slightly less assistance. Pt continues to have some impaired balance with initial stand which required some assistance to correct. PT will continue to follow to address strength, mobility, and gait.     Time Calculation:   PT Charges     Row Name 12/02/20 1021             Time Calculation    Start Time  0947  -      Stop Time  1004  -      Time Calculation (min)  17 min  -      PT Received On  12/02/20  -      PT - Next Appointment  12/03/20  -         Time Calculation- PT    Total Timed Code Minutes- PT  16 minute(s)  -        User Key  (r) = Recorded By, (t) = Taken By, (c) = Cosigned By    Initials Name Provider Type     Jocelyne Berry PT Physical Therapist        Therapy Charges for Today     Code Description Service Date Service Provider Modifiers Qty    88225906568  PT EVAL MOD COMPLEXITY 2 12/1/2020 Jocelyne Berry PT GP 1    79947815697  PT THERAPEUTIC ACT EA 15 MIN 12/1/2020 Jocelyne Berry PT GP 1    61799108108 HC PT THER SUPP EA 15 MIN 12/1/2020 Jocelyne Berry PT GP 1    81856107276 HC PT THER PROC EA 15 MIN  12/2/2020 Jocelyne Berry, PT GP 1          PT G-Codes  Outcome Measure Options: AM-PAC 6 Clicks Basic Mobility (PT)  AM-PAC 6 Clicks Score (PT): 17    Jocelyne Berry, PT  12/2/2020

## 2020-12-02 NOTE — CONSULTS
Referring Provider: Dr. Carrington Olivares  Reason for Consultation: hypoNa    Subjective     Chief complaint   Chief Complaint   Patient presents with   • Shortness of Breath       History of present illness:  89 yo WF with normal renal fxn and likely chronic hypoNa (with serum sodium levels 126-130 in March '20 as well as in July '15), admitted 7/28 with shortness of breath, though she tells me now she has no recollection of why she was admitted.  Renal consulted for serum sodium 126 today, with admission value several days ago 130.  Full PMH outlined below; pertinent is COPD with chronic respiratory failure; cardiomyopathy with EF 25%; hypertension and use of loop diuretic; and valvular heart disease.  ROS not especially reliable as her recall of recent events is poor.  Blood cultures drawn this admission reveal Staphylococcus, coagulase-negative  · Denies any urinary complaints, though has had issues with urinary retention while here (-850 mL)  · Denies excessive fluid intake at home  · Stable weight for the past several months  · No orthopnea or leg swelling  · No nausea or vomiting: Describes appetite as good    Past Medical History:   Diagnosis Date   • Abdominal aortic aneurysm (CMS/HCC)    • Aortic stenosis    • Congestive heart disease (CMS/HCC)    • COPD (chronic obstructive pulmonary disease) (CMS/HCC)    • Diastolic CHF, chronic (CMS/HCC)    • Malignant neoplasm of lung (CMS/HCC)      History reviewed. No pertinent surgical history.  History reviewed. No pertinent family history.  Social History     Tobacco Use   • Smoking status: Former Smoker   Substance Use Topics   • Alcohol use: No   • Drug use: Not on file     Medications Prior to Admission   Medication Sig Dispense Refill Last Dose   • amLODIPine (NORVASC) 10 MG tablet Take 1 tablet by mouth daily.      • aspirin 81 MG tablet Take 1 tablet by mouth daily.      • atenolol (TENORMIN) 25 MG tablet Take 75 mg by mouth 3 (Three) Times a Day.      •  "Calcium Carbonate-Vitamin D (CALCIUM + D PO) Take 1 tablet by mouth Daily.      • furosemide (LASIX) 20 MG tablet Take 20 mg by mouth 2 (Two) Times a Day.      • gabapentin (NEURONTIN) 300 MG capsule Take 1 capsule by mouth 2 (two) times a day.      • losartan (COZAAR) 100 MG tablet Take 1 tablet by mouth daily.      • Multiple Vitamin (MULTI-VITAMIN) tablet Take 1 tablet by mouth daily.      • promethazine (PHENERGAN) 25 MG tablet Take 1 tablet by mouth 3 (three) times a day as needed.      • simvastatin (ZOCOR) 20 MG tablet Take 20 mg by mouth Every Night.      • Tetrahydrozoline HCl (EYE DROPS OP) Apply  to eye.      • Timolol Maleate 0.5 % (DAILY) solution Apply 1 drop to eye daily.      • Umeclidinium-Vilanterol 62.5-25 MCG/INH aerosol powder  Inhale.        Allergies:  Patient has no known allergies.    Review of Systems  14-point ROS performed--though of questionable reliability--and all negative except for pertinent +/-'s detailed in HPI.     Objective     Vital Signs  Temp:  [97.3 °F (36.3 °C)-98.8 °F (37.1 °C)] 97.5 °F (36.4 °C)  Heart Rate:  [67-94] 67  Resp:  [18] 18  BP: (112-139)/(54-76) 112/54    Flowsheet Rows      First Filed Value   Admission Height  167.6 cm (66\") Documented at 11/28/2020 0846   Admission Weight  77.6 kg (171 lb) Documented at 11/28/2020 0846           I/O this shift:  In: 330 [P.O.:330]  Out: 250 [Urine:250]  I/O last 3 completed shifts:  In: 890 [P.O.:870; I.V.:20]  Out: 3050 [Urine:3050]    Intake/Output Summary (Last 24 hours) at 12/2/2020 1702  Last data filed at 12/2/2020 1507  Gross per 24 hour   Intake 330 ml   Output 2300 ml   Net -1970 ml       Physical Exam:  NAD; pleasant; partially oriented (\"December; 1990; [place]\"); looks stated age  MMM; no scleral icterus  No JVD; bilateral carotid bruits  A few crackles bilaterally; decreased breath sounds in bases; not labored on room air  RRR, no rub  Soft, NT, ND, BS+  No edema  No clubbing  No asterixis  Moves all " extremities   Mood and affect normal  Speech is fluent    Results Review:  Results from last 7 days   Lab Units 12/01/20  0818 11/30/20  1031 11/29/20  0913 11/28/20  0838   SODIUM mmol/L 126* 125* 129* 130*   POTASSIUM mmol/L 4.1 3.9 4.0 4.3   CHLORIDE mmol/L 89* 89* 91* 95*   CO2 mmol/L 28.3 27.6 26.3 23.9   BUN mg/dL 10 10 13 9   CREATININE mg/dL 0.58 0.63 0.73 0.83   CALCIUM mg/dL 8.4 8.3 8.4 8.8   BILIRUBIN mg/dL  --   --  0.7 0.6   ALK PHOS U/L  --   --  54 66   ALT (SGPT) U/L  --   --  9 10   AST (SGOT) U/L  --   --  19 19   GLUCOSE mg/dL 90 92 106* 266*       Estimated Creatinine Clearance: 39.8 mL/min (by C-G formula based on SCr of 0.58 mg/dL).          Results from last 7 days   Lab Units 11/29/20  0829 11/28/20  0838   WBC 10*3/mm3 6.69 7.18   HEMOGLOBIN g/dL 12.4 14.1   PLATELETS 10*3/mm3 226 294             Active Medications  aspirin, 81 mg, Oral, Daily  atorvastatin, 10 mg, Oral, Daily  budesonide-formoterol, 2 puff, Inhalation, BID - RT  enoxaparin, 40 mg, Subcutaneous, Q24H  gabapentin, 300 mg, Oral, Q12H  metoprolol tartrate, 12.5 mg, Oral, Q12H  sodium chloride, 10 mL, Intravenous, Q12H  tiotropium bromide monohydrate, 2 puff, Inhalation, Daily - RT           Assessment/Plan   Assessment  1.  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  Unclear thyroid status.  By ROS, she does not appear to have polydipsia; stable weight for the past several months and admission albumin of 3.8 suggest reasonably good nutritional status  2.  Chronic lung disease with COPD  3.  Valvular heart disease  4.  Cardiomyopathy with ejection fraction 25% and likely CHF exacerbation  5.  Hypertension, slightly overcontrolled; low blood pressure will also drive ADH secretion  6.  CNS bacteremia: suspect a  contaminant      Acute respiratory failure with hypoxia and hypercapnia (CMS/HCC)      Plan  1.  FENa and Uosm  2.  TSH  3.  Orthostatics  4.  Serum uric acid level  5.  Surveillance labs    I discussed the patient's findings and my recommendations with the patient    Daniel Pickett MD  12/02/20  17:02 EST

## 2020-12-02 NOTE — PLAN OF CARE
Goal Outcome Evaluation:  Plan of Care Reviewed With: patient   Confused but at baseline per son. Walk and chair with elisa knight remains. Consult in to uro and nephro for low na. Tylenol prn for headache. Safety maintained ctm.

## 2020-12-02 NOTE — PLAN OF CARE
Problem: Adult Inpatient Plan of Care  Goal: Plan of Care Review  Outcome: Ongoing, Progressing  Flowsheets (Taken 12/2/2020 0332)  Plan of Care Reviewed With: patient  Outcome Summary: VSS, on 1L, F/C in place, no acute changes overnight, will continue to monitor.

## 2020-12-02 NOTE — PLAN OF CARE
Goal Outcome Evaluation:  Plan of Care Reviewed With: patient     Outcome Summary: Pt demonstrates some increased activity tolerance and functional strength as she was able to increase her gait distance and required slightly less assistance. Pt continues to have some impaired balance with initial stand which required some assistance to correct. PT will continue to follow to address strength, mobility, and gait.Patient was intermittently wearing a face mask during this therapy encounter. Therapist used appropriate personal protective equipment including eye protection, mask, and gloves.  Mask used was standard procedure mask. Appropriate PPE was worn during the entire therapy session. Hand hygiene was completed before and after therapy session. Patient is not in enhanced droplet precautions.

## 2020-12-03 ENCOUNTER — APPOINTMENT (OUTPATIENT)
Dept: GENERAL RADIOLOGY | Facility: HOSPITAL | Age: 85
End: 2020-12-03

## 2020-12-03 PROBLEM — J96.02 ACUTE RESPIRATORY FAILURE WITH HYPOXIA AND HYPERCAPNIA (HCC): Status: RESOLVED | Noted: 2020-11-28 | Resolved: 2020-12-03

## 2020-12-03 PROBLEM — J96.01 ACUTE RESPIRATORY FAILURE WITH HYPOXIA AND HYPERCAPNIA (HCC): Status: RESOLVED | Noted: 2020-11-28 | Resolved: 2020-12-03

## 2020-12-03 LAB
ARTERIAL PATENCY WRIST A: POSITIVE
ATMOSPHERIC PRESS: 758.1 MMHG
B PARAPERT DNA SPEC QL NAA+PROBE: NOT DETECTED
B PERT DNA SPEC QL NAA+PROBE: NOT DETECTED
BACTERIA SPEC AEROBE CULT: NORMAL
BASE EXCESS BLDA CALC-SCNC: -1 MMOL/L (ref 0–2)
BDY SITE: ABNORMAL
C PNEUM DNA NPH QL NAA+NON-PROBE: NOT DETECTED
CREAT UR-MCNC: 31.4 MG/DL
FLUAV SUBTYP SPEC NAA+PROBE: NOT DETECTED
FLUBV RNA ISLT QL NAA+PROBE: NOT DETECTED
GAS FLOW AIRWAY: 15 LPM
GLUCOSE BLDC GLUCOMTR-MCNC: 109 MG/DL (ref 70–130)
GLUCOSE BLDC GLUCOMTR-MCNC: 122 MG/DL (ref 70–130)
GLUCOSE BLDC GLUCOMTR-MCNC: 155 MG/DL (ref 70–130)
GLUCOSE BLDC GLUCOMTR-MCNC: 95 MG/DL (ref 70–130)
GLUCOSE BLDC GLUCOMTR-MCNC: 95 MG/DL (ref 70–130)
HADV DNA SPEC NAA+PROBE: NOT DETECTED
HCO3 BLDA-SCNC: 34.4 MMOL/L (ref 22–28)
HCOV 229E RNA SPEC QL NAA+PROBE: NOT DETECTED
HCOV HKU1 RNA SPEC QL NAA+PROBE: NOT DETECTED
HCOV NL63 RNA SPEC QL NAA+PROBE: NOT DETECTED
HCOV OC43 RNA SPEC QL NAA+PROBE: NOT DETECTED
HMPV RNA NPH QL NAA+NON-PROBE: NOT DETECTED
HPIV1 RNA SPEC QL NAA+PROBE: NOT DETECTED
HPIV2 RNA SPEC QL NAA+PROBE: NOT DETECTED
HPIV3 RNA NPH QL NAA+PROBE: NOT DETECTED
HPIV4 P GENE NPH QL NAA+PROBE: NOT DETECTED
M PNEUMO IGG SER IA-ACNC: NOT DETECTED
MAGNESIUM SERPL-MCNC: 2 MG/DL (ref 1.7–2.3)
MODALITY: ABNORMAL
PCO2 BLDA: 113.4 MM HG (ref 35–45)
PH BLDA: 7.09 PH UNITS (ref 7.35–7.45)
PO2 BLDA: 50.4 MM HG (ref 80–100)
POTASSIUM SERPL-SCNC: 4.6 MMOL/L (ref 3.5–5.2)
RHINOVIRUS RNA SPEC NAA+PROBE: NOT DETECTED
RSV RNA NPH QL NAA+NON-PROBE: NOT DETECTED
SAO2 % BLDCOA: 66.9 % (ref 92–99)
SARS-COV-2 RNA NPH QL NAA+NON-PROBE: NOT DETECTED
SODIUM UR-SCNC: <20 MMOL/L
TOTAL RATE: 28 BREATHS/MINUTE
TROPONIN T SERPL-MCNC: <0.01 NG/ML (ref 0–0.03)
URATE SERPL-MCNC: 5.7 MG/DL (ref 2.4–5.7)

## 2020-12-03 PROCEDURE — 94799 UNLISTED PULMONARY SVC/PX: CPT

## 2020-12-03 PROCEDURE — 83735 ASSAY OF MAGNESIUM: CPT | Performed by: INTERNAL MEDICINE

## 2020-12-03 PROCEDURE — 97530 THERAPEUTIC ACTIVITIES: CPT

## 2020-12-03 PROCEDURE — 84484 ASSAY OF TROPONIN QUANT: CPT | Performed by: INTERNAL MEDICINE

## 2020-12-03 PROCEDURE — 25010000002 FUROSEMIDE PER 20 MG

## 2020-12-03 PROCEDURE — 82803 BLOOD GASES ANY COMBINATION: CPT

## 2020-12-03 PROCEDURE — 82962 GLUCOSE BLOOD TEST: CPT

## 2020-12-03 PROCEDURE — 71045 X-RAY EXAM CHEST 1 VIEW: CPT

## 2020-12-03 PROCEDURE — 94660 CPAP INITIATION&MGMT: CPT

## 2020-12-03 PROCEDURE — 84550 ASSAY OF BLOOD/URIC ACID: CPT | Performed by: INTERNAL MEDICINE

## 2020-12-03 PROCEDURE — 0202U NFCT DS 22 TRGT SARS-COV-2: CPT | Performed by: INTERNAL MEDICINE

## 2020-12-03 PROCEDURE — 84132 ASSAY OF SERUM POTASSIUM: CPT | Performed by: INTERNAL MEDICINE

## 2020-12-03 PROCEDURE — 25010000002 ENOXAPARIN PER 10 MG: Performed by: INTERNAL MEDICINE

## 2020-12-03 PROCEDURE — 80069 RENAL FUNCTION PANEL: CPT | Performed by: INTERNAL MEDICINE

## 2020-12-03 PROCEDURE — 93005 ELECTROCARDIOGRAM TRACING: CPT | Performed by: INTERNAL MEDICINE

## 2020-12-03 PROCEDURE — 36600 WITHDRAWAL OF ARTERIAL BLOOD: CPT

## 2020-12-03 PROCEDURE — 93010 ELECTROCARDIOGRAM REPORT: CPT | Performed by: INTERNAL MEDICINE

## 2020-12-03 RX ORDER — FUROSEMIDE 10 MG/ML
INJECTION INTRAMUSCULAR; INTRAVENOUS
Status: COMPLETED
Start: 2020-12-03 | End: 2020-12-03

## 2020-12-03 RX ORDER — BUDESONIDE AND FORMOTEROL FUMARATE DIHYDRATE 160; 4.5 UG/1; UG/1
2 AEROSOL RESPIRATORY (INHALATION)
Qty: 1 INHALER | Refills: 0 | Status: SHIPPED | OUTPATIENT
Start: 2020-12-03

## 2020-12-03 RX ORDER — FUROSEMIDE 10 MG/ML
80 INJECTION INTRAMUSCULAR; INTRAVENOUS ONCE
Status: COMPLETED | OUTPATIENT
Start: 2020-12-03 | End: 2020-12-03

## 2020-12-03 RX ADMIN — BUDESONIDE AND FORMOTEROL FUMARATE DIHYDRATE 2 PUFF: 160; 4.5 AEROSOL RESPIRATORY (INHALATION) at 08:07

## 2020-12-03 RX ADMIN — GABAPENTIN 300 MG: 300 CAPSULE ORAL at 08:32

## 2020-12-03 RX ADMIN — TIOTROPIUM BROMIDE INHALATION SPRAY 2 PUFF: 3.12 SPRAY, METERED RESPIRATORY (INHALATION) at 08:07

## 2020-12-03 RX ADMIN — SODIUM CHLORIDE, PRESERVATIVE FREE 10 ML: 5 INJECTION INTRAVENOUS at 20:05

## 2020-12-03 RX ADMIN — FUROSEMIDE 80 MG: 10 INJECTION, SOLUTION INTRAMUSCULAR; INTRAVENOUS at 17:30

## 2020-12-03 RX ADMIN — ASPIRIN 81 MG: 81 TABLET, COATED ORAL at 08:31

## 2020-12-03 RX ADMIN — ATORVASTATIN CALCIUM 10 MG: 20 TABLET, FILM COATED ORAL at 08:31

## 2020-12-03 RX ADMIN — ENOXAPARIN SODIUM 40 MG: 40 INJECTION SUBCUTANEOUS at 11:02

## 2020-12-03 RX ADMIN — SODIUM CHLORIDE, PRESERVATIVE FREE 10 ML: 5 INJECTION INTRAVENOUS at 08:32

## 2020-12-03 RX ADMIN — METOPROLOL TARTRATE 12.5 MG: 25 TABLET, FILM COATED ORAL at 08:31

## 2020-12-03 RX ADMIN — FUROSEMIDE 80 MG: 10 INJECTION INTRAMUSCULAR; INTRAVENOUS at 17:30

## 2020-12-03 NOTE — PROGRESS NOTES
Continued Stay Note  [facility]     Patient Name: Christine Petersen  MRN: [id number]  Today's Date: 12/3/2020    Admit Date: 11/28/2020    Discharge Plan     Row Name 12/03/20 1527       Plan    Plan  SNF referrals pending    Patient/Family in Agreement with Plan  yes    Plan Comments  CCP followed up with pt's son (Chaparro George, [phone]) earlier today who confirmed plan for pt to return home with him and [facility]/following. Son now requesting SNF placement in the [facility]. SNF referrals pending to [facility] and [facility], awaiting evals. Елена Goins LCSW        Discharge Codes    No documentation.       Expected Discharge Date and Time     Expected Discharge Date Expected Discharge Time    Dec 3, 2020             Rachel Goins LCSW

## 2020-12-03 NOTE — CODE DOCUMENTATION
Pt had sudden resp distress and gurgly/crackles. Pt tachynic and abd breathing. Decreased o2 sats 78-80-%. RR 36. ABG/xray performed. Bipap being placed on patient. IV lasix given. Dr dalal aware and new orders were received. Family updated.

## 2020-12-03 NOTE — DISCHARGE PLACEMENT REQUEST
"Christine Bernstein (89 y.o. 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        Admission Date Admission Type Admitting Provider Attending Provider Department, Room/Bed    11/28/20 Emergency Carrington Olivares MD Kellie, Scott P, MD [address], [address]    Discharge Date Discharge Disposition Discharge Destination         Home or Self Care              Attending Provider: Carrington Olivares MD    Allergies: No Known Allergies    Isolation: None   Infection: None   Code Status: No CPR    Ht: 167 cm (65.75\")   Wt: 66.6 kg (146 lb 13.2 oz)    Admission Cmt: None   Principal Problem: None                Active Insurance as of 11/28/2020     Primary Coverage     Payor Plan Insurance Group Employer/Plan Group    MEDICARE MEDICARE A & B      Payor Plan Address Payor Plan Phone Number Payor Plan Fax Number Effective Dates    [address] [phone]  8/1/1990 - None Entered    [facility] [address]       Subscriber Name Subscriber Birth Date Member ID       CHRISTINE BERNSTEIN 7/27/1925 [id number]           Secondary Coverage     Payor Plan Insurance Group Employer/Plan Group    [employer] [id number]     Payor Plan Address Payor Plan Phone Number Payor Plan Fax Number Effective Dates    PO  [phone]  10/1/1992 - None Entered    [facility] [id number]       Subscriber Name Subscriber Birth Date Member ID       CHRISTINE BERNSTEIN 7/27/1925 [id number]                 Emergency Contacts      (Rel.) Home Phone Work Phone Mobile Phone    NicolaChaparro (Son) -- -- [phone]    NicolaJoey (Son) -- -- [phone]              "

## 2020-12-03 NOTE — PLAN OF CARE
Goal Outcome Evaluation:  Plan of Care Reviewed With: patient  Progress: improving  Outcome Summary: VSS; no c/o SOA or pain; urine sample collected clean catch via f/c; will continue to monitor

## 2020-12-03 NOTE — CONSULTS
FIRST UROLOGY CONSULT      Patient Identification:  NAME:  Christine Petersen  Age:  89 y.o.   Sex:  female   :  1925   MRN:  [id number]       Chief complaint: Urinary retention    History of present illness:  This is a 89 year old female with a history of COPD, CMP with EF 25%, HTN, valvular heart disease admitted for chronic hypoxemic respiratory failure. During her admission, she was noted to have low urine output. Bladder scan revealed 800 cc in the bladder. A knight catheter was placed. The patient denies gross hematuria, weak stream or difficulty urinating prior to admission, recent UTI, or prior  surgeries. Of note, tiotropium started during this admission.      Past medical history:  Past Medical History:   Diagnosis Date   • Abdominal aortic aneurysm (CMS/HCC)    • Aortic stenosis    • Congestive heart disease (CMS/HCC)    • COPD (chronic obstructive pulmonary disease) (CMS/HCC)    • Diastolic CHF, chronic (CMS/HCC)    • Malignant neoplasm of lung (CMS/HCC)        Past surgical history:  History reviewed. No pertinent surgical history.    Allergies:  Patient has no known allergies.    Home medications:  Medications Prior to Admission   Medication Sig Dispense Refill Last Dose   • amLODIPine (NORVASC) 10 MG tablet Take 1 tablet by mouth daily.      • aspirin 81 MG tablet Take 1 tablet by mouth daily.      • atenolol (TENORMIN) 25 MG tablet Take 75 mg by mouth 3 (Three) Times a Day.      • Calcium Carbonate-Vitamin D (CALCIUM + D PO) Take 1 tablet by mouth Daily.      • furosemide (LASIX) 20 MG tablet Take 20 mg by mouth 2 (Two) Times a Day.      • gabapentin (NEURONTIN) 300 MG capsule Take 1 capsule by mouth 2 (two) times a day.      • losartan (COZAAR) 100 MG tablet Take 1 tablet by mouth daily.      • Multiple Vitamin (MULTI-VITAMIN) tablet Take 1 tablet by mouth daily.      • promethazine (PHENERGAN) 25 MG tablet Take 1 tablet by mouth 3 (three) times a day as needed.      • simvastatin  (ZOCOR) 20 MG tablet Take 20 mg by mouth Every Night.      • Tetrahydrozoline HCl (EYE DROPS OP) Apply  to eye.      • Timolol Maleate 0.5 % (DAILY) solution Apply 1 drop to eye daily.      • Umeclidinium-Vilanterol 62.5-25 MCG/INH aerosol powder  Inhale.           Hospital medications:  aspirin, 81 mg, Oral, Daily  atorvastatin, 10 mg, Oral, Daily  budesonide-formoterol, 2 puff, Inhalation, BID - RT  enoxaparin, 40 mg, Subcutaneous, Q24H  gabapentin, 300 mg, Oral, Q12H  metoprolol tartrate, 12.5 mg, Oral, Q12H  sodium chloride, 10 mL, Intravenous, Q12H  tiotropium bromide monohydrate, 2 puff, Inhalation, Daily - RT         •  acetaminophen **OR** acetaminophen  •  enalaprilat  •  ipratropium-albuterol  •  loperamide  •  magnesium sulfate **OR** magnesium sulfate **OR** magnesium sulfate  •  ondansetron **OR** ondansetron  •  potassium chloride **OR** potassium chloride **OR** potassium chloride  •  sodium chloride    Family history:  History reviewed. No pertinent family history.    Social history:  Social History     Tobacco Use   • Smoking status: Former Smoker   Substance Use Topics   • Alcohol use: No   • Drug use: Not on file       Review of systems:      Positive for:  As per HPI. Fatigue, shortness of breath  Negative for:  Fevers, chills, blurry vision, headaches, sore throat, hearing loss, enlarged cervical lymph nodes, chest pain, palpitations, wheezing, diarrhea, constipation, hematochezia, depressed mood, anxiety, rash, joint pain, weakness, numbness.    Objective:  TMax 24 hours:   Temp (24hrs), Av °F (36.7 °C), Min:97.4 °F (36.3 °C), Max:98.7 °F (37.1 °C)      Vitals Ranges:   Temp:  [97.4 °F (36.3 °C)-98.7 °F (37.1 °C)] 97.4 °F (36.3 °C)  Heart Rate:  [66-97] 77  Resp:  [18] 18  BP: (112-132)/(54-64) 132/64    Intake/Output Last 3 shifts:  I/O last 3 completed shifts:  In: 330 [P.O.:330]  Out: 3175 [Urine:3175]     Physical Exam:    General Appearance:    Alert, cooperative, NAD   HEENT:    No  trauma, pupils reactive, hearing intact   Back:     No CVA tenderness   Lungs:     Respirations unlabored, no wheezing    Heart:    RRR, intact peripheral pulses   Abdomen:     Soft, NDNT, no masses, no guarding   :    Pelvic not performed, bladder nondistended and nontender. Lucas intact, urine yellow and clear.   Extremities:   No edema, no deformity   Lymphatic:   No neck or groin LAD   Skin:   No bleeding, bruising or rashes   Neuro/Psych:   Orientation intact, mood/affect pleasant, no focal findings       Results review:   I reviewed the patient's new clinical results.    Data review:  Lab Results (last 24 hours)     Procedure Component Value Units Date/Time    Blood Culture - Blood, Arm, Left [[id number]] Collected: 11/28/20 0900    Specimen: Blood from Arm, Left Updated: 12/03/20 0930     Blood Culture No growth at 5 days    POC Glucose Once [[id number]]  (Normal) Collected: 12/03/20 0558    Specimen: Blood Updated: 12/03/20 0559     Glucose 95 mg/dL     Sodium, Urine, Random - [[id number]] Collected: 12/02/20 2312    Specimen: Urine Updated: 12/03/20 0011     Sodium, Urine <20 mmol/L     Narrative:      Reference intervals for random urine have not been established.  Clinical usage is dependent upon physician's interpretation in combination with other laboratory tests.       Creatinine, Urine, Random - [[id number]] Collected: 12/02/20 2312    Specimen: Urine Updated: 12/03/20 0006     Creatinine, Urine 31.4 mg/dL     Narrative:      Reference intervals for random urine have not been established.  Clinical usage is dependent upon physician's interpretation in combination with other laboratory tests.       Osmolality, Urine - Urine, Catheter [[id number]] Collected: 12/02/20 2312    Specimen: Urine, Catheter Updated: 12/02/20 2335     Osmolality, Urine 172 mOsm/kg     Narrative:      Osmo Normal Reference Ranges:    Random:  mOsm/kg H2O, depending on fluid intake.  Random: >850 mOsm/kg H20, after 12 hour  fluid restriction.    24 Hour: 300-900 mOsm/kg H2O.    POC Glucose Once [[id number]]  (Normal) Collected: 12/02/20 2026    Specimen: Blood Updated: 12/02/20 2027     Glucose 111 mg/dL     POC Glucose Once [[id number]]  (Normal) Collected: 12/02/20 1612    Specimen: Blood Updated: 12/02/20 1615     Glucose 105 mg/dL     POC Glucose Once [[id number]]  (Normal) Collected: 12/02/20 1151    Specimen: Blood Updated: 12/02/20 1204     Glucose 103 mg/dL            Imaging:  Imaging Results (Last 24 Hours)     ** No results found for the last 24 hours. **             Assessment:       Acute respiratory failure with hypoxia and hypercapnia (CMS/HCC)    Urinary retention    Plan:     - Urinary retention likely secondary to deconditioning, recumbency, and recent initiation of tiotropium.   - Minimize anticholinergics if possible  - Recommend continuing knight catheter for now. We will arrange follow up in 1 week for voiding trial.    Steve Sarabia Jr., MD  12/03/20  10:38 EST

## 2020-12-03 NOTE — THERAPY TREATMENT NOTE
Patient Name: Christine Petersen  : 1925    MRN: [id number]                              Today's Date: 12/3/2020       Admit Date: 2020    Visit Dx:     ICD-10-CM ICD-9-CM   1. Acute respiratory failure with hypoxia and hypercapnia (CMS/HCC)  J96.01 518.81    J96.02    2. Acute congestive heart failure, unspecified heart failure type (CMS/HCC)  I50.9 428.0     Patient Active Problem List   Diagnosis   • Mild aortic stenosis   • Chronic diastolic heart failure (CMS/HCC)   • COPD (chronic obstructive pulmonary disease) (CMS/HCC)     Past Medical History:   Diagnosis Date   • Abdominal aortic aneurysm (CMS/HCC)    • Aortic stenosis    • Congestive heart disease (CMS/HCC)    • COPD (chronic obstructive pulmonary disease) (CMS/HCC)    • Diastolic CHF, chronic (CMS/HCC)    • Malignant neoplasm of lung (CMS/HCC)      History reviewed. No pertinent surgical history.  General Information     Row Name 20 1504          Physical Therapy Time and Intention    Document Type  therapy note (daily note)  -     Mode of Treatment  individual therapy;physical therapy  -     Row Name 20 1504          General Information    Existing Precautions/Restrictions  fall  -     Row Name 20 1504          Cognition    Orientation Status (Cognition)  oriented to;person;place  -     Row Name 20 1504          Safety Issues, Functional Mobility    Impairments Affecting Function (Mobility)  balance;endurance/activity tolerance;strength  -       User Key  (r) = Recorded By, (t) = Taken By, (c) = Cosigned By    Initials Name Provider Type     Jocelyne Berry PT Physical Therapist        Mobility     Row Name 20 1505          Bed Mobility    Bed Mobility  supine-sit;sit-supine  -     Supine-Sit [place] (Bed Mobility)  verbal cues;nonverbal cues (demo/gesture);contact guard  -     Sit-Supine [place] (Bed Mobility)  verbal cues;nonverbal cues (demo/gesture);contact guard  -     Row Name  12/03/20 1505          Sit-Stand Transfer    Sit-Stand [place] (Transfers)  verbal cues;nonverbal cues (demo/gesture);contact guard  -CH     Assistive Device (Sit-Stand Transfers)  walker, front-wheeled  -CH     Row Name 12/03/20 1505          Gait/Stairs (Locomotion)    [place] Level (Gait)  verbal cues;nonverbal cues (demo/gesture);contact guard  -CH     Assistive Device (Gait)  walker, front-wheeled  -CH     Distance in Feet (Gait)  150  -CH     Deviations/Abnormal Patterns (Gait)  anaya decreased;gait speed decreased;stride length decreased  -CH     Bilateral Gait Deviations  forward flexed posture  -       User Key  (r) = Recorded By, (t) = Taken By, (c) = Cosigned By    Initials Name Provider Type    Jocelyne Albarado PT Physical Therapist        Obj/Interventions    No documentation.       Goals/Plan    No documentation.       Clinical Impression     Row Name 12/03/20 1506          Pain Scale: FACES Pre/Post-Treatment    Pain: FACES Scale, Pretreatment  0-->no hurt  -CH     Posttreatment Pain Rating  0-->no hurt  -CH     Row Name 12/03/20 1506          Plan of Care Review    Plan of Care Reviewed With  patient  -     Outcome Summary  Pt demonstrates some increased activity tolerance and functional strength as she was able to increase her gait distance and required less assistance. Pt did have some SOA and fatigue at end of ambulation and attempt to sit back in the bed a bit impulsively. PT will continue to follow to address strength, mobility, and gait.  -     Row Name 12/03/20 1501          Positioning and Restraints    Pre-Treatment Position  in bed  -     Post Treatment Position  bed  -     In Bed  supine;call light within reach;encouraged to call for assist;exit alarm on  -CH       User Key  (r) = Recorded By, (t) = Taken By, (c) = Cosigned By    Initials Name Provider Type    Jocelyne Albarado PT Physical Therapist        Outcome Measures     Row Name 12/03/20 1502           How much help from another person do you currently need...    Turning from your back to your side while in flat bed without using bedrails?  3  -CH     Moving from lying on back to sitting on the side of a flat bed without bedrails?  3  -CH     Moving to and from a bed to a chair (including a wheelchair)?  3  -CH     Standing up from a chair using your arms (e.g., wheelchair, bedside chair)?  3  -CH     Climbing 3-5 steps with a railing?  2  -CH     To walk in hospital room?  3  -CH     AM-PAC 6 Clicks Score (PT)  17  -     Row Name 12/03/20 1508          Functional Assessment    Outcome Measure Options  AM-PAC 6 Clicks Basic Mobility (PT)  -       User Key  (r) = Recorded By, (t) = Taken By, (c) = Cosigned By    Initials Name Provider Type     Jocelyne Berry PT Physical Therapist        Physical Therapy Education                 Title: PT OT SLP Therapies (Done)     Topic: Physical Therapy (Done)     Point: Mobility training (Done)     Learning Progress Summary           Patient Acceptance, E,TB,D, VU,NR by  at 12/3/2020 1508    Acceptance, E,TB,D, VU,NR by  at 12/2/2020 1159    Acceptance, E,TB,D, VU,NR by  at 12/1/2020 1217                   Point: Home exercise program (Done)     Learning Progress Summary           Patient Acceptance, E,TB,D, VU,NR by  at 12/3/2020 1508    Acceptance, E,TB,D, VU,NR by  at 12/2/2020 1159    Acceptance, E,TB,D, VU,NR by  at 12/1/2020 1217                   Point: Body mechanics (Done)     Learning Progress Summary           Patient Acceptance, E,TB,D, VU,NR by  at 12/3/2020 1508    Acceptance, E,TB,D, VU,NR by  at 12/2/2020 1159    Acceptance, E,TB,D, VU,NR by  at 12/1/2020 1217                   Point: Precautions (Done)     Learning Progress Summary           Patient Acceptance, E,TB,D, VU,NR by  at 12/3/2020 1508    Acceptance, E,TB,D, VU,NR by  at 12/2/2020 1159    Acceptance, E,TB,D, VU,NR by  at 12/1/2020 0143                                User Key     Initials Effective Dates Name Provider Type Discipline     04/03/18 -  Jocelyne Berry PT Physical Therapist PT              PT Recommendation and Plan  Planned Therapy Interventions (PT): balance training, bed mobility training, gait training, home exercise program, patient/family education, strengthening, transfer training  Plan of Care Reviewed With: patient  Outcome Summary: Pt demonstrates some increased activity tolerance and functional strength as she was able to increase her gait distance and required less assistance. Pt did have some SOA and fatigue at end of ambulation and attempt to sit back in the bed a bit impulsively. PT will continue to follow to address strength, mobility, and gait.     Time Calculation:   PT Charges     Row Name 12/03/20 1509             Time Calculation    Start Time  1326  -      Stop Time  1340  -      Time Calculation (min)  14 min  -      PT Received On  12/03/20  -      PT - Next Appointment  12/04/20  -         Time Calculation- PT    Total Timed Code Minutes- PT  13 minute(s)  -        User Key  (r) = Recorded By, (t) = Taken By, (c) = Cosigned By    Initials Name Provider Type     Jocelyne Berry PT Physical Therapist        Therapy Charges for Today     Code Description Service Date Service Provider Modifiers Qty    75204130987 HC PT THER PROC EA 15 MIN 12/2/2020 Jocelyne Berry, PT GP 1    75704151039  PT THERAPEUTIC ACT EA 15 MIN 12/3/2020 Jocelyne Berry PT GP 1          PT G-Codes  Outcome Measure Options: AM-PAC 6 Clicks Basic Mobility (PT)  AM-PAC 6 Clicks Score (PT): 17    Jocelyne Berry PT  12/3/2020

## 2020-12-03 NOTE — PROGRESS NOTES
[facility]     Mar/chart reviewed  Follow up respiratory failure  She wants to go home, she is a little confused  I spoke with son Joey, he says going home with the catheter is not going to work.   She has no complaints of cough or shortness of breath    Vital Sign Min/Max for last 24 hours  Temp  Min: 97.4 °F (36.3 °C)  Max: 98.7 °F (37.1 °C)   BP  Min: 119/61  Max: 132/64   Pulse  Min: 66  Max: 97   Resp  Min: 18  Max: 18   SpO2  Min: 92 %  Max: 96 %   Flow (L/min)  Min: 1  Max: 1   Weight  Min: 66.6 kg (146 lb 13.2 oz)  Max: 66.6 kg (146 lb 13.2 oz)     Nad, axox3,   perrl, eomi, no icterus,  mmm, no jvd, trachea midline, neck supple,  chest cta bilaterally, no crackles, no wheezes,   rrr,   soft, nt, nd +bs,  no c/c/ e  Skin warm, dry no rashes    Labs: 12/3: reviewed:  Nothing new      A/P:  1. Acute hypoxemic and hypercapnic respiratory failure -- wean oxygen as able  2. Acute systolic chf -- better today,medications per cardiology  3. Hyponatremia --  monitor  4. COPD - continue bronchodilators  5. Hyperglycemia -- SSI  6. HTN -- continue home meds  7. HLD -- conitnue home meds  8. Dnr/dni  9. Elevated troponin  10. Valvular heart disease --moderate aortic valve stenosis, mod mitral valve stenosis  11. Diarrhea -- prn immodium  12. Urinary retention --will d/c lama    Discussed with son, will need to explore placement options

## 2020-12-03 NOTE — PLAN OF CARE
Goal Outcome Evaluation:  Plan of Care Reviewed With: patient  Progress: improving  Outcome Summary: Pt demonstrates some increased activity tolerance and functional strength as she was able to increase her gait distance and required less assistance. Pt did have some SOA and fatigue at end of ambulation and attempt to sit back in the bed a bit impulsively. PT will continue to follow to address strength, mobility, and gait.Patient was intermittently wearing a face mask during this therapy encounter. Therapist used appropriate personal protective equipment including eye protection, mask, and gloves.  Mask used was standard procedure mask. Appropriate PPE was worn during the entire therapy session. Hand hygiene was completed before and after therapy session. Patient is not in enhanced droplet precautions.

## 2020-12-03 NOTE — PROGRESS NOTES
Continued Stay Note  [facility]     Patient Name: Christine Petersen  MRN: [id number]  Today's Date: 12/3/2020    Admit Date: 11/28/2020    Discharge Plan     Row Name 12/03/20 1620       Plan    Plan  [facility] skilled PENDING negative covid 19 result (test pending)    Patient/Family in Agreement with Plan  yes    Plan Comments  Per Liz, [facility] can accept pt for skilled rehab today PENDING negative covid 19 result. Rapid test entered and pending. [facility] updated pt's son (Chaparro Petersen, [phone]) who is agreeable and will transport pt to facility. D/w RN. Packet on pt chart. Елена Goins LCSW                                   Discharge Codes    No documentation.       Expected Discharge Date and Time     Expected Discharge Date Expected Discharge Time    Dec 3, 2020             Rachel Goins LCSW

## 2020-12-04 LAB
ALBUMIN SERPL-MCNC: 3.1 G/DL (ref 3.5–5.2)
ANION GAP SERPL CALCULATED.3IONS-SCNC: 4.4 MMOL/L (ref 5–15)
BUN SERPL-MCNC: 14 MG/DL (ref 8–23)
BUN/CREAT SERPL: 17.9 (ref 7–25)
CALCIUM SPEC-SCNC: 8.4 MG/DL (ref 8.2–9.6)
CHLORIDE SERPL-SCNC: 95 MMOL/L (ref 98–107)
CO2 SERPL-SCNC: 31.6 MMOL/L (ref 22–29)
CREAT SERPL-MCNC: 0.78 MG/DL (ref 0.57–1)
GFR SERPL CREATININE-BSD FRML MDRD: 69 ML/MIN/1.73
GLUCOSE BLDC GLUCOMTR-MCNC: 115 MG/DL (ref 70–130)
GLUCOSE BLDC GLUCOMTR-MCNC: 138 MG/DL (ref 70–130)
GLUCOSE BLDC GLUCOMTR-MCNC: 87 MG/DL (ref 70–130)
GLUCOSE BLDC GLUCOMTR-MCNC: 92 MG/DL (ref 70–130)
GLUCOSE SERPL-MCNC: 114 MG/DL (ref 65–99)
MAGNESIUM SERPL-MCNC: 2.1 MG/DL (ref 1.7–2.3)
PHOSPHATE SERPL-MCNC: 5.1 MG/DL (ref 2.5–4.5)
PLATELET # BLD AUTO: 245 10*3/MM3 (ref 140–450)
POTASSIUM SERPL-SCNC: 4.4 MMOL/L (ref 3.5–5.2)
POTASSIUM SERPL-SCNC: 4.6 MMOL/L (ref 3.5–5.2)
QT INTERVAL: 370 MS
SODIUM SERPL-SCNC: 131 MMOL/L (ref 136–145)
TSH SERPL DL<=0.05 MIU/L-ACNC: 0.92 UIU/ML (ref 0.27–4.2)

## 2020-12-04 PROCEDURE — 94799 UNLISTED PULMONARY SVC/PX: CPT

## 2020-12-04 PROCEDURE — 25010000002 ENOXAPARIN PER 10 MG: Performed by: INTERNAL MEDICINE

## 2020-12-04 PROCEDURE — 84132 ASSAY OF SERUM POTASSIUM: CPT | Performed by: INTERNAL MEDICINE

## 2020-12-04 PROCEDURE — 83735 ASSAY OF MAGNESIUM: CPT | Performed by: INTERNAL MEDICINE

## 2020-12-04 PROCEDURE — 84443 ASSAY THYROID STIM HORMONE: CPT | Performed by: INTERNAL MEDICINE

## 2020-12-04 PROCEDURE — 82962 GLUCOSE BLOOD TEST: CPT

## 2020-12-04 PROCEDURE — 85049 AUTOMATED PLATELET COUNT: CPT | Performed by: INTERNAL MEDICINE

## 2020-12-04 RX ORDER — FUROSEMIDE 20 MG/1
20 TABLET ORAL DAILY
Status: DISCONTINUED | OUTPATIENT
Start: 2020-12-04 | End: 2020-12-04

## 2020-12-04 RX ORDER — FUROSEMIDE 40 MG/1
40 TABLET ORAL DAILY
Status: DISCONTINUED | OUTPATIENT
Start: 2020-12-05 | End: 2020-12-06 | Stop reason: HOSPADM

## 2020-12-04 RX ADMIN — METOPROLOL TARTRATE 12.5 MG: 25 TABLET, FILM COATED ORAL at 08:36

## 2020-12-04 RX ADMIN — BUDESONIDE AND FORMOTEROL FUMARATE DIHYDRATE 2 PUFF: 160; 4.5 AEROSOL RESPIRATORY (INHALATION) at 19:30

## 2020-12-04 RX ADMIN — SODIUM CHLORIDE, PRESERVATIVE FREE 10 ML: 5 INJECTION INTRAVENOUS at 08:37

## 2020-12-04 RX ADMIN — ENOXAPARIN SODIUM 40 MG: 40 INJECTION SUBCUTANEOUS at 13:13

## 2020-12-04 RX ADMIN — FUROSEMIDE 20 MG: 20 TABLET ORAL at 15:56

## 2020-12-04 RX ADMIN — SODIUM CHLORIDE, PRESERVATIVE FREE 10 ML: 5 INJECTION INTRAVENOUS at 20:39

## 2020-12-04 RX ADMIN — METOPROLOL TARTRATE 12.5 MG: 25 TABLET, FILM COATED ORAL at 20:39

## 2020-12-04 RX ADMIN — BUDESONIDE AND FORMOTEROL FUMARATE DIHYDRATE 2 PUFF: 160; 4.5 AEROSOL RESPIRATORY (INHALATION) at 07:47

## 2020-12-04 RX ADMIN — ATORVASTATIN CALCIUM 10 MG: 20 TABLET, FILM COATED ORAL at 08:36

## 2020-12-04 RX ADMIN — GABAPENTIN 300 MG: 300 CAPSULE ORAL at 20:39

## 2020-12-04 RX ADMIN — GABAPENTIN 300 MG: 300 CAPSULE ORAL at 08:36

## 2020-12-04 RX ADMIN — ASPIRIN 81 MG: 81 TABLET, COATED ORAL at 08:36

## 2020-12-04 NOTE — PROGRESS NOTES
I spoke with her son to Joey ([phone]).  He was trying to gauge where things stood with her.  I explained that her heart was functioning less than half of normal and that she did have age-related mitral and aortic stenosis.  I explained there were no procedures or anything that we could do that would be beneficial and she was best managed with medications.  I explained that it was a tight rope between her being fluid overloaded because of her heart and volume depleted causing problems to her kidneys.    He says the goal is for her to go to [place] and try to be rehab to where she could go back home where she lives with his brother.    He asked about her prognosis and I said it was guarded but at this time I thought they had the right plans.  However, should she continue to have problems with fluid overload, we may need to consider hospice and goals of care.  He was very reasonable and accepting of this.  I explained that from a cardiac standpoint there is no reason that she could not participate in rehab at this time.    She should follow-up with her usual cardiologist after discharge.

## 2020-12-04 NOTE — PROGRESS NOTES
[facility]      Mar/chart reviewed  Follow up respiratory failure  Had desat last night, got placed on bipap  pulm edema again on cxr  Now she is back to baseline it appears  No complaints of shortness of breath or chest pain, she wants to go home.     Vital Sign Min/Max for last 24 hours  Temp  Min: 97.7 °F (36.5 °C)  Max: 97.8 °F (36.6 °C)   BP  Min: 97/40  Max: 178/79   Pulse  Min: 71  Max: 118   Resp  Min: 18  Max: 28   SpO2  Min: 78 %  Max: 99 %   Flow (L/min)  Min: 5  Max: 15   Weight  Min: 64.4 kg (141 lb 15.6 oz)  Max: 64.4 kg (141 lb 15.6 oz)   1040/700  Nad, axox3,   perrl, eomi, no icterus,  mmm, no jvd, trachea midline, neck supple,  chest decreased ae bilaterally, no crackles, no wheezes,   rrr,   soft, nt, nd +bs,  no c/c/ e  Skin warm, dry no rashes    Labs; 12/4: reviewed:  Na 131  Bicarb 31  12/3: 7.09/113/50  12/3: cxr: bilateral infiltrates    A/P:  1. Acute hypoxemic and hypercapnic respiratory failure -- wean oxygen as able  2. Acute systolic chf -- better today,will continue some lasix if ok with nephrology-cardiology has signed off (?)  3. Hyponatremia --  monitor  4. COPD - continue bronchodilators  5. Hyperglycemia -- SSI  6. HTN -- continue home meds  7. HLD -- conitnue home meds  8. Dnr/dni  9. Elevated troponin  10. Valvular heart disease --moderate aortic valve stenosis, mod mitral valve stenosis  11. Diarrhea -- prn immodium  12. Urinary retention --will d/c lama    Will need placement as long as she has knight per my coversation with son yesterday

## 2020-12-04 NOTE — PROGRESS NOTES
Continued Stay Note  [facility]     Patient Name: Christine Petersen  MRN: [id number]  Today's Date: 12/4/2020    Admit Date: 11/28/2020    Discharge Plan     Row Name 12/04/20 1303       Plan    Plan  [facility]    Patient/Family in Agreement with Plan  yes    Plan Comments  [facility]. Per Liz, if pt discharged over the weekend staff to contact [facility] ([phone]) to obtain bed assignment. Packet on pt chart. Елена Goins LCSW        Discharge Codes    No documentation.       Expected Discharge Date and Time     Expected Discharge Date Expected Discharge Time    Dec 3, 2020             Rachel Goins LCSW

## 2020-12-04 NOTE — PLAN OF CARE
Goal Outcome Evaluation:  Plan of Care Reviewed With: patient  Progress: improving  Outcome Summary: No c/o pain or soa. Currently on 5L NC. Alert and orient to self. Up in chair for lunch. 5 beat VT, mag 2.1, K 4.4. Dr Olivares spoke wiht patients son Joey on the phone, cardiology reconsulted and for them to call and talk with son also regarding heart failure. Started PO Lasix.

## 2020-12-04 NOTE — PROGRESS NOTES
NEPHROLOGY PROGRESS NOTE    PATIENT IDENTIFICATION:   Name:  Christine Petersen      MRN:  [id number]     89 y.o.  female             Reason for visit: hypoNa    SUBJECTIVE:   Events of the day noted; on BiPAP and lethargic, but does awaken when name called    OBJECTIVE:  Vitals:    12/03/20 1757 12/03/20 1800 12/03/20 1805 12/03/20 1943   BP:   178/79 116/64   BP Location:    Right arm   Patient Position:    Lying   Pulse: 116 118  84   Resp:   28 24   Temp:       TempSrc:       SpO2: 93% 93%  96%   Weight:       Height:               Body mass index is 23.88 kg/m².    Intake/Output Summary (Last 24 hours) at 12/3/2020 2019  Last data filed at 12/3/2020 1414  Gross per 24 hour   Intake 920 ml   Output 1225 ml   Net -305 ml     Wt Readings from Last 1 Encounters:   12/03/20 0603 66.6 kg (146 lb 13.2 oz)   12/02/20 0555 60 kg (132 lb 4.4 oz)   11/28/20 1417 67 kg (147 lb 11.3 oz)   11/28/20 1100 67.4 kg (148 lb 9.4 oz)   11/28/20 0846 77.6 kg (171 lb)     Wt Readings from Last 3 Encounters:   12/03/20 66.6 kg (146 lb 13.2 oz)   02/10/17 77.6 kg (171 lb)   01/29/16 74.8 kg (165 lb)         Exam:  NAD; on BiPAP; awakens when name called but lethargic; looks stated age  MMM; no scleral icterus  No JVD; bilateral carotid bruits  A few crackles bilaterally; decreased breath sounds in bases  RRR, no rub  Soft, NT, ND, BS+  No edema  No clubbing  No asterixis  Moves all extremities   Mood and affect normal  Speech is fluent    Scheduled meds:    aspirin, 81 mg, Oral, Daily  atorvastatin, 10 mg, Oral, Daily  budesonide-formoterol, 2 puff, Inhalation, BID - RT  enoxaparin, 40 mg, Subcutaneous, Q24H  gabapentin, 300 mg, Oral, Q12H  metoprolol tartrate, 12.5 mg, Oral, Q12H  sodium chloride, 10 mL, Intravenous, Q12H      IV meds:                           Data Review:    Results from last 7 days   Lab Units 12/01/20  0818 11/30/20  1031 11/29/20  0913 11/28/20  0838   SODIUM mmol/L 126* 125* 129* 130*   POTASSIUM mmol/L 4.1 3.9  4.0 4.3   CHLORIDE mmol/L 89* 89* 91* 95*   CO2 mmol/L 28.3 27.6 26.3 23.9   BUN mg/dL 10 10 13 9   CREATININE mg/dL 0.58 0.63 0.73 0.83   CALCIUM mg/dL 8.4 8.3 8.4 8.8   BILIRUBIN mg/dL  --   --  0.7 0.6   ALK PHOS U/L  --   --  54 66   ALT (SGPT) U/L  --   --  9 10   AST (SGOT) U/L  --   --  19 19   GLUCOSE mg/dL 90 92 106* 266*     Estimated Creatinine Clearance: 44.2 mL/min (by C-G formula based on SCr of 0.58 mg/dL).  Results from last 7 days   Lab Units 12/02/20  2312   SODIUM UR mmol/L <20   CREATININE UR mg/dL 31.4   OSMOLALITY UR mOsm/kg 172           Results from last 7 days   Lab Units 11/29/20  0829 11/28/20  0838   WBC 10*3/mm3 6.69 7.18   HEMOGLOBIN g/dL 12.4 14.1   PLATELETS 10*3/mm3 226 294                   ASSESSMENT:     * No active hospital problems. *    1.  Chronic hyponatremia, unchanged, with hypervolemia primary player.  Sodium-avid urine c/w CHF.  Low urine osmolality, suggesting excess water intake and appropriate water diuresis.  Despite her significant lung disease, urine studies are not consistent with SIADH.   TSH has still not yet been collected (?)  2.  Chronic lung disease with COPD: acute hypercapnic respiratory failure with hypoxemia  3.  Valvular heart disease  4.  Cardiomyopathy with ejection fraction 25% and likely CHF exacerbation  5.  Hypertension, slightly overcontrolled; low blood pressure will also drive ADH secretion  6.  CNS bacteremia: suspect a contaminant  7.  Urinary retention      PLAN:  1.  Give another dose of IV Lasix  2.  BiPAP  3.  Re-order TSH  4.  Surveillance labs      Daniel Pickett MD  12/3/2020    20:19 EST

## 2020-12-04 NOTE — PLAN OF CARE
Goal Outcome Evaluation:  Plan of Care Reviewed With: patient  Progress: improving  Outcome Summary: Pt wearing Bipap or 15L nonrebreather during shift. No falls. No c/o pain. Lucas remains in place. Turned Q2. Resting comfortably. Monitoring closely.

## 2020-12-05 LAB
ALBUMIN SERPL-MCNC: 3 G/DL (ref 3.5–5.2)
ANION GAP SERPL CALCULATED.3IONS-SCNC: 5.8 MMOL/L (ref 5–15)
BUN SERPL-MCNC: 13 MG/DL (ref 8–23)
BUN/CREAT SERPL: 21.7 (ref 7–25)
CALCIUM SPEC-SCNC: 8.4 MG/DL (ref 8.2–9.6)
CHLORIDE SERPL-SCNC: 90 MMOL/L (ref 98–107)
CO2 SERPL-SCNC: 29.2 MMOL/L (ref 22–29)
CREAT SERPL-MCNC: 0.6 MG/DL (ref 0.57–1)
GFR SERPL CREATININE-BSD FRML MDRD: 93 ML/MIN/1.73
GLUCOSE BLDC GLUCOMTR-MCNC: 121 MG/DL (ref 70–130)
GLUCOSE BLDC GLUCOMTR-MCNC: 165 MG/DL (ref 70–130)
GLUCOSE BLDC GLUCOMTR-MCNC: 91 MG/DL (ref 70–130)
GLUCOSE BLDC GLUCOMTR-MCNC: 99 MG/DL (ref 70–130)
GLUCOSE SERPL-MCNC: 75 MG/DL (ref 65–99)
PHOSPHATE SERPL-MCNC: 3.3 MG/DL (ref 2.5–4.5)
POTASSIUM SERPL-SCNC: 4.9 MMOL/L (ref 3.5–5.2)
SODIUM SERPL-SCNC: 125 MMOL/L (ref 136–145)

## 2020-12-05 PROCEDURE — 90686 IIV4 VACC NO PRSV 0.5 ML IM: CPT | Performed by: INTERNAL MEDICINE

## 2020-12-05 PROCEDURE — 25010000002 ENOXAPARIN PER 10 MG: Performed by: INTERNAL MEDICINE

## 2020-12-05 PROCEDURE — 94799 UNLISTED PULMONARY SVC/PX: CPT

## 2020-12-05 PROCEDURE — 82962 GLUCOSE BLOOD TEST: CPT

## 2020-12-05 PROCEDURE — G0008 ADMIN INFLUENZA VIRUS VAC: HCPCS | Performed by: INTERNAL MEDICINE

## 2020-12-05 PROCEDURE — 25010000002 INFLUENZA VAC SPLIT QUAD 0.5 ML SUSPENSION PREFILLED SYRINGE: Performed by: INTERNAL MEDICINE

## 2020-12-05 PROCEDURE — 80069 RENAL FUNCTION PANEL: CPT | Performed by: INTERNAL MEDICINE

## 2020-12-05 PROCEDURE — 97530 THERAPEUTIC ACTIVITIES: CPT

## 2020-12-05 RX ADMIN — INFLUENZA VIRUS VACCINE 0.5 ML: 15; 15; 15; 15 SUSPENSION INTRAMUSCULAR at 13:03

## 2020-12-05 RX ADMIN — SODIUM CHLORIDE, PRESERVATIVE FREE 10 ML: 5 INJECTION INTRAVENOUS at 20:13

## 2020-12-05 RX ADMIN — METOPROLOL TARTRATE 12.5 MG: 25 TABLET, FILM COATED ORAL at 20:12

## 2020-12-05 RX ADMIN — BUDESONIDE AND FORMOTEROL FUMARATE DIHYDRATE 2 PUFF: 160; 4.5 AEROSOL RESPIRATORY (INHALATION) at 19:18

## 2020-12-05 RX ADMIN — GABAPENTIN 300 MG: 300 CAPSULE ORAL at 08:44

## 2020-12-05 RX ADMIN — BUDESONIDE AND FORMOTEROL FUMARATE DIHYDRATE 2 PUFF: 160; 4.5 AEROSOL RESPIRATORY (INHALATION) at 07:20

## 2020-12-05 RX ADMIN — GABAPENTIN 300 MG: 300 CAPSULE ORAL at 20:12

## 2020-12-05 RX ADMIN — FUROSEMIDE 40 MG: 40 TABLET ORAL at 08:44

## 2020-12-05 RX ADMIN — ASPIRIN 81 MG: 81 TABLET, COATED ORAL at 08:44

## 2020-12-05 RX ADMIN — SODIUM CHLORIDE, PRESERVATIVE FREE 10 ML: 5 INJECTION INTRAVENOUS at 08:47

## 2020-12-05 RX ADMIN — ATORVASTATIN CALCIUM 10 MG: 20 TABLET, FILM COATED ORAL at 08:44

## 2020-12-05 RX ADMIN — METOPROLOL TARTRATE 12.5 MG: 25 TABLET, FILM COATED ORAL at 08:44

## 2020-12-05 RX ADMIN — ENOXAPARIN SODIUM 40 MG: 40 INJECTION SUBCUTANEOUS at 12:55

## 2020-12-05 NOTE — PLAN OF CARE
Goal Outcome Evaluation:  Pt alert and oriented to self. VSS on 2L NC. Probable d/c tomorrow or Monday to [facility]. Pt family wishes for [facility] home to be called rehab to pt to help with the transition.

## 2020-12-05 NOTE — PLAN OF CARE
Goal Outcome Evaluation:  Plan of Care Reviewed With: patient  Progress: improving  Outcome Summary: Pt continues to make steady progress with PT as she was able to walk 150ft with just CGA and walker. Pt states she would prefer to not go to SNF but is agreeable if needed. PT will continue to follow to address strength, mobility, and gait.Patient was intermittently wearing a face mask during this therapy encounter. Therapist used appropriate personal protective equipment including eye protection, mask, and gloves.  Mask used was standard procedure mask. Appropriate PPE was worn during the entire therapy session. Hand hygiene was completed before and after therapy session. Patient is not in enhanced droplet precautions.

## 2020-12-05 NOTE — PROGRESS NOTES
"   LOS: 7 days    Patient Care Team:  Demetris Kennedy MD as PCP - General    Chief Complaint:    Chief Complaint   Patient presents with   • Shortness of Breath     Follow UP hyponatremia  Subjective     Interval History:   Feels ok, no dyspnea, becomes upset when rehab even mentioned     Objective     Vital Signs  Temp:  [97.6 °F (36.4 °C)-99 °F (37.2 °C)] 97.6 °F (36.4 °C)  Heart Rate:  [71-83] 71  Resp:  [16-24] 16  BP: (114-143)/(58-95) 143/59    Flowsheet Rows      First Filed Value   Admission Height  167.6 cm (66\") Documented at 11/28/2020 0846   Admission Weight  77.6 kg (171 lb) Documented at 11/28/2020 0846          I/O this shift:  In: -   Out: 150 [Urine:150]  I/O last 3 completed shifts:  In: 360 [P.O.:360]  Out: 1125 [Urine:1125]    Intake/Output Summary (Last 24 hours) at 12/5/2020 1029  Last data filed at 12/5/2020 0743  Gross per 24 hour   Intake 240 ml   Output 925 ml   Net -685 ml       Physical Exam:  GEN frail WF in no acute distress  Neck supple no JVD  Lungs CTA bilat no rales  CV RRR no m/g  abd soft NT/Nd  vasc no pedal/ankle edema     Results Review:    Results from last 7 days   Lab Units 12/05/20  0440 12/04/20  1120 12/03/20  2328 12/01/20  0818  11/29/20  0913   SODIUM mmol/L 125*  --  131* 126*   < > 129*   POTASSIUM mmol/L 4.9 4.4 4.6  4.6 4.1   < > 4.0   CHLORIDE mmol/L 90*  --  95* 89*   < > 91*   CO2 mmol/L 29.2*  --  31.6* 28.3   < > 26.3   BUN mg/dL 13  --  14 10   < > 13   CREATININE mg/dL 0.60  --  0.78 0.58   < > 0.73   CALCIUM mg/dL 8.4  --  8.4 8.4   < > 8.4   BILIRUBIN mg/dL  --   --   --   --   --  0.7   ALK PHOS U/L  --   --   --   --   --  54   ALT (SGPT) U/L  --   --   --   --   --  9   AST (SGOT) U/L  --   --   --   --   --  19   GLUCOSE mg/dL 75  --  114* 90   < > 106*    < > = values in this interval not displayed.       Estimated Creatinine Clearance: 41 mL/min (by C-G formula based on SCr of 0.6 mg/dL).    Results from last 7 days   Lab Units " 12/05/20  0440 12/04/20  1120 12/03/20  2328   MAGNESIUM mg/dL  --  2.1 2.0   PHOSPHORUS mg/dL 3.3  --  5.1*       Results from last 7 days   Lab Units 12/03/20  2328   URIC ACID mg/dL 5.7       Results from last 7 days   Lab Units 12/04/20  0416 11/29/20  0829   WBC 10*3/mm3  --  6.69   HEMOGLOBIN g/dL  --  12.4   PLATELETS 10*3/mm3 245 226               Imaging Results (Last 24 Hours)     ** No results found for the last 24 hours. **        aspirin, 81 mg, Oral, Daily  atorvastatin, 10 mg, Oral, Daily  budesonide-formoterol, 2 puff, Inhalation, BID - RT  enoxaparin, 40 mg, Subcutaneous, Q24H  furosemide, 40 mg, Oral, Daily  gabapentin, 300 mg, Oral, Q12H  influenza vaccine, 0.5 mL, Intramuscular, Once  metoprolol tartrate, 12.5 mg, Oral, Q12H  sodium chloride, 10 mL, Intravenous, Q12H           Medication Review:   Current Facility-Administered Medications   Medication Dose Route Frequency Provider Last Rate Last Admin   • acetaminophen (TYLENOL) tablet 650 mg  650 mg Oral Q4H PRN Carrington Olivares MD   325 mg at 12/02/20 1303    Or   • acetaminophen (TYLENOL) suppository 650 mg  650 mg Rectal Q4H PRN Carrington Olivares MD       • aspirin EC tablet 81 mg  81 mg Oral Daily Carrington Olivares MD   81 mg at 12/05/20 0844   • atorvastatin (LIPITOR) tablet 10 mg  10 mg Oral Daily Carrington Olivares MD   10 mg at 12/05/20 0844   • budesonide-formoterol (SYMBICORT) 160-4.5 MCG/ACT inhaler 2 puff  2 puff Inhalation BID - RT Carrington Olivares MD   2 puff at 12/05/20 0720   • enalaprilat (VASOTEC) injection 0.625 mg  0.625 mg Intravenous Q6H PRN Carrington Olivares MD       • enoxaparin (LOVENOX) syringe 40 mg  40 mg Subcutaneous Q24H Carrington Olivares MD   40 mg at 12/04/20 1313   • furosemide (LASIX) tablet 40 mg  40 mg Oral Daily Daniel Pickett MD   40 mg at 12/05/20 0844   • gabapentin (NEURONTIN) capsule 300 mg  300 mg Oral Q12H Carrington Olivares MD   300 mg at 12/05/20 0844   • influenza vac split quad  (FLUZONE,FLUARIX,AFLURIA,FLULAVAL) injection 0.5 mL  0.5 mL Intramuscular Once Carrington Olivares MD       • ipratropium-albuterol (DUO-NEB) nebulizer solution 3 mL  3 mL Nebulization Q2H PRN Carrington Olivares MD       • loperamide (IMODIUM) capsule 2 mg  2 mg Oral 4x Daily PRN Carrington Olivares MD   2 mg at 11/30/20 2006   • Magnesium Sulfate 2 gram Bolus, followed by 8 gram infusion (total Mg dose 10 grams)- Mg less than or equal to 1mg/dL  2 g Intravenous PRN Carrington Olivares MD        Or   • Magnesium Sulfate 2 gram / 50mL Infusion (GIVE X 3 BAGS TO EQUAL 6GM TOTAL DOSE) - Mg 1.1 - 1.5 mg/dl  2 g Intravenous PRN Carrington Olivares MD        Or   • Magnesium Sulfate 4 gram infusion- Mg 1.6-1.9 mg/dL  4 g Intravenous PRN Carrington Olivares MD       • metoprolol tartrate (LOPRESSOR) tablet 12.5 mg  12.5 mg Oral Q12H David Jauregui MD   12.5 mg at 12/05/20 0844   • ondansetron (ZOFRAN) tablet 4 mg  4 mg Oral Q6H PRN Carrington Olviares MD        Or   • ondansetron (ZOFRAN) injection 4 mg  4 mg Intravenous Q6H PRN Carrington Olivares MD       • potassium chloride (K-DUR,KLOR-CON) ER tablet 40 mEq  40 mEq Oral PRN Carrington Olivares MD        Or   • potassium chloride (KLOR-CON) packet 40 mEq  40 mEq Oral PRN Carrington Olivares MD        Or   • potassium chloride 10 mEq in 100 mL IVPB  10 mEq Intravenous Q1H PRN Carrington Olivares MD       • sodium chloride 0.9 % flush 10 mL  10 mL Intravenous Q12H Carrington Olivares MD   10 mL at 12/05/20 0847   • sodium chloride 0.9 % flush 10 mL  10 mL Intravenous PRN Carrington Olivares MD           Assessment/Plan   1.  Chronic hyponatremia, related to CHF, no e/o SIADH.  Na labile, 125 - 130 for the most part, on low end today despite inc lasix yesterday 40mg.  Does not have formal fluid restriction in diet.  Renal fcn normal  2.  Chronic lung disease with COPD: acute hypercapnic respiratory failure with hypoxemia that responded to BiPAP on 12/3  3.  Valvular heart disease  4.  Cardiomyopathy with  ejection fraction 25% appears compensated now but vol status tenuous; Wt down 136 lbs, no periph edema or dyspnea currently; lasix inc'd yest  5.  Hypertension, controlled   6.  CNS bacteremia: suspect a contaminant  7.  Urinary retention    Plan  - no objection to discharge to [facility] rehab if bed available.  She's 89 yo and I don't think we can optimize Na further.  At least asymptomatic.  Added fluid restriction 1500 cc/day to diet and would continue lasix 40mg daily.  Recommend check BMP weekly x 3 at [facility] after discharge, can fax these results to us at [phone].  D/W RN      * No active hospital problems. *              Francisco Pineda MD  12/05/20  10:29 EST

## 2020-12-05 NOTE — THERAPY TREATMENT NOTE
Patient Name: Christine Petersen  : 1925    MRN: [id number]                              Today's Date: 2020       Admit Date: 2020    Visit Dx:     ICD-10-CM ICD-9-CM   1. Acute respiratory failure with hypoxia and hypercapnia (CMS/HCC)  J96.01 518.81    J96.02    2. Acute congestive heart failure, unspecified heart failure type (CMS/HCC)  I50.9 428.0     Patient Active Problem List   Diagnosis   • Mild aortic stenosis   • Chronic diastolic heart failure (CMS/HCC)   • COPD (chronic obstructive pulmonary disease) (CMS/HCC)     Past Medical History:   Diagnosis Date   • Abdominal aortic aneurysm (CMS/HCC)    • Aortic stenosis    • Congestive heart disease (CMS/HCC)    • COPD (chronic obstructive pulmonary disease) (CMS/HCC)    • Diastolic CHF, chronic (CMS/HCC)    • Malignant neoplasm of lung (CMS/HCC)      History reviewed. No pertinent surgical history.  General Information     Row Name 20 1230          Physical Therapy Time and Intention    Document Type  therapy note (daily note)  -     Mode of Treatment  individual therapy;physical therapy  -     Row Name 20 1230          General Information    Existing Precautions/Restrictions  fall  -     Row Name 20 1230          Cognition    Orientation Status (Cognition)  oriented to;person;place  -     Row Name 20 1230          Safety Issues, Functional Mobility    Impairments Affecting Function (Mobility)  balance;endurance/activity tolerance;strength  -       User Key  (r) = Recorded By, (t) = Taken By, (c) = Cosigned By    Initials Name Provider Type     Jocelyne Berry PT Physical Therapist        Mobility     Row Name 20 1230          Bed Mobility    Bed Mobility  supine-sit;sit-supine  -     Supine-Sit [place] (Bed Mobility)  verbal cues;nonverbal cues (demo/gesture);contact guard  -     Sit-Supine [place] (Bed Mobility)  verbal cues;nonverbal cues (demo/gesture);contact guard  -     Row Name  12/05/20 1230          Sit-Stand Transfer    Sit-Stand [place] (Transfers)  verbal cues;nonverbal cues (demo/gesture);contact guard  -     Assistive Device (Sit-Stand Transfers)  walker, front-wheeled  -CH     Row Name 12/05/20 1230          Gait/Stairs (Locomotion)    [place] Level (Gait)  verbal cues;nonverbal cues (demo/gesture);contact guard  -CH     Assistive Device (Gait)  walker, front-wheeled  -CH     Distance in Feet (Gait)  150  -     Deviations/Abnormal Patterns (Gait)  anaya decreased;gait speed decreased;stride length decreased  -     Bilateral Gait Deviations  forward flexed posture  -       User Key  (r) = Recorded By, (t) = Taken By, (c) = Cosigned By    Initials Name Provider Type    Jocelyne Albarado, PT Physical Therapist        Obj/Interventions    No documentation.       Goals/Plan    No documentation.       Clinical Impression     Row Name 12/05/20 1232          Pain    Additional Documentation  Pain Scale: FACES Pre/Post-Treatment (Group)  -     Row Name 12/05/20 1232          Pain Scale: FACES Pre/Post-Treatment    Pain: FACES Scale, Pretreatment  0-->no hurt  -     Posttreatment Pain Rating  0-->no hurt  -CH     Row Name 12/05/20 1232          Plan of Care Review    Plan of Care Reviewed With  patient  -     Outcome Summary  Pt continues to make steady progress with PT as she was able to walk 150ft with just CGA and walker. Pt states she would prefer to not go to SNF but is agreeable if needed. PT will continue to follow to address strength, mobility, and gait.  -     Row Name 12/05/20 1232          Positioning and Restraints    Pre-Treatment Position  in bed  -     Post Treatment Position  bed  -     In Bed  supine;call light within reach;encouraged to call for assist;exit alarm on  -       User Key  (r) = Recorded By, (t) = Taken By, (c) = Cosigned By    Initials Name Provider Type    Jocelyne Albarado PT Physical Therapist        Outcome Measures      Row Name 12/05/20 1235          How much help from another person do you currently need...    Turning from your back to your side while in flat bed without using bedrails?  3  -CH     Moving from lying on back to sitting on the side of a flat bed without bedrails?  3  -CH     Moving to and from a bed to a chair (including a wheelchair)?  3  -CH     Standing up from a chair using your arms (e.g., wheelchair, bedside chair)?  3  -CH     Climbing 3-5 steps with a railing?  2  -CH     To walk in hospital room?  3  -CH     AM-PAC 6 Clicks Score (PT)  17  -     Row Name 12/05/20 1235          Functional Assessment    Outcome Measure Options  AM-PAC 6 Clicks Basic Mobility (PT)  -       User Key  (r) = Recorded By, (t) = Taken By, (c) = Cosigned By    Initials Name Provider Type     Jocelyne Berry, PT Physical Therapist        Physical Therapy Education                 Title: PT OT SLP Therapies (Done)     Topic: Physical Therapy (Done)     Point: Mobility training (Done)     Learning Progress Summary           Patient Acceptance, E,TB,D, VU,NR by  at 12/5/2020 1235    Acceptance, E,TB,D, VU,NR by  at 12/3/2020 1508    Acceptance, E,TB,D, VU,NR by  at 12/2/2020 1159    Acceptance, E,TB,D, VU,NR by  at 12/1/2020 1217                   Point: Home exercise program (Done)     Learning Progress Summary           Patient Acceptance, E,TB,D, VU,NR by  at 12/5/2020 1235    Acceptance, E,TB,D, VU,NR by  at 12/3/2020 1508    Acceptance, E,TB,D, VU,NR by  at 12/2/2020 1159    Acceptance, E,TB,D, VU,NR by  at 12/1/2020 1217                   Point: Body mechanics (Done)     Learning Progress Summary           Patient Acceptance, E,TB,D, VU,NR by  at 12/5/2020 1235    Acceptance, E,TB,D, VU,NR by  at 12/3/2020 1508    Acceptance, E,TB,D, VU,NR by  at 12/2/2020 1159    Acceptance, E,TB,D, VU,NR by  at 12/1/2020 1217                   Point: Precautions (Done)     Learning Progress Summary            Patient Acceptance, E,TB,D, VU,NR by  at 12/5/2020 1235    Acceptance, E,TB,D, VU,NR by  at 12/3/2020 1508    Acceptance, E,TB,D, VU,NR by  at 12/2/2020 1159    Acceptance, E,TB,D, VU,NR by  at 12/1/2020 1217                               User Key     Initials Effective Dates Name Provider Type Discipline     04/03/18 -  Jocelyne Berry PT Physical Therapist PT              PT Recommendation and Plan  Planned Therapy Interventions (PT): balance training, bed mobility training, gait training, home exercise program, patient/family education, strengthening, transfer training  Plan of Care Reviewed With: patient  Outcome Summary: Pt continues to make steady progress with PT as she was able to walk 150ft with just CGA and walker. Pt states she would prefer to not go to SNF but is agreeable if needed. PT will continue to follow to address strength, mobility, and gait.     Time Calculation:   PT Charges     Row Name 12/05/20 1236             Time Calculation    Start Time  1137  -      Stop Time  1152  -      Time Calculation (min)  15 min  -      PT Received On  12/05/20  -      PT - Next Appointment  12/06/20  -         Time Calculation- PT    Total Timed Code Minutes- PT  14 minute(s)  -        User Key  (r) = Recorded By, (t) = Taken By, (c) = Cosigned By    Initials Name Provider Type     Jocelyne Berry PT Physical Therapist        Therapy Charges for Today     Code Description Service Date Service Provider Modifiers Qty    [id number]  PT THERAPEUTIC ACT EA 15 MIN 12/5/2020 Jocelyne Berry PT GP 1          PT G-Codes  Outcome Measure Options: AM-PAC 6 Clicks Basic Mobility (PT)  AM-PAC 6 Clicks Score (PT): 17    Jocelyne Berry PT  12/5/2020

## 2020-12-05 NOTE — PLAN OF CARE
Goal Outcome Evaluation:  Plan of Care Reviewed With: patient  Progress: improving  Outcome Summary: Vitals stable. No falls. No c/o pain. Pt refusing assisted turns, stated she will turn herself. Education complete on importance of turns. Lucas remains in place. Weaned to 2L nasal cannula. Resting comfortably. Monitoring closely.

## 2020-12-05 NOTE — PROGRESS NOTES
NEPHROLOGY PROGRESS NOTE    PATIENT IDENTIFICATION:   Name:  Crhistine Petersen      MRN:  [id number]     89 y.o.  female             Reason for visit: hypoNa    SUBJECTIVE:   Awake; euphoric; eager to go home.  Denies shortness of breath; appetite fine    OBJECTIVE:  Vitals:    12/04/20 1336 12/04/20 1929 12/04/20 1930 12/04/20 2039   BP: 120/59 119/95  114/80   BP Location: Left arm Left arm     Patient Position: Lying Lying     Pulse: 77 82 83 76   Resp: 24 20 20    Temp: 99 °F (37.2 °C) 97.9 °F (36.6 °C)     TempSrc: Oral Oral     SpO2: 98% 97% 97%    Weight:       Height:               Body mass index is 23.09 kg/m².    Intake/Output Summary (Last 24 hours) at 12/4/2020 2107  Last data filed at 12/4/2020 1929  Gross per 24 hour   Intake 120 ml   Output 550 ml   Net -430 ml     Wt Readings from Last 1 Encounters:   12/04/20 0525 64.4 kg (141 lb 15.6 oz)   12/03/20 0603 66.6 kg (146 lb 13.2 oz)   12/02/20 0555 60 kg (132 lb 4.4 oz)   11/28/20 1417 67 kg (147 lb 11.3 oz)   11/28/20 1100 67.4 kg (148 lb 9.4 oz)   11/28/20 0846 77.6 kg (171 lb)     Wt Readings from Last 3 Encounters:   12/04/20 64.4 kg (141 lb 15.6 oz)   02/10/17 77.6 kg (171 lb)   01/29/16 74.8 kg (165 lb)         Exam:  NAD; awake, conversant, not fully oriented; odd affect; looks stated age  MMM; no scleral icterus  No JVD; bilateral carotid bruits  Crackles bilaterally; decreased breath sounds in bases  RRR, no rub, 2/6M  Soft, NT, ND, BS+  No edema  No clubbing  No asterixis  Moves all extremities   Mood and affect normal  Speech is fluent    Scheduled meds:    aspirin, 81 mg, Oral, Daily  atorvastatin, 10 mg, Oral, Daily  budesonide-formoterol, 2 puff, Inhalation, BID - RT  enoxaparin, 40 mg, Subcutaneous, Q24H  furosemide, 20 mg, Oral, Daily  gabapentin, 300 mg, Oral, Q12H  [START ON 12/5/2020] influenza vaccine, 0.5 mL, Intramuscular, Once  metoprolol tartrate, 12.5 mg, Oral, Q12H  sodium chloride, 10 mL, Intravenous, Q12H      IV meds:                            Data Review:    Results from last 7 days   Lab Units 12/04/20  1120 12/03/20  2328 12/01/20  0818 11/30/20  1031 11/29/20  0913 11/28/20  0838   SODIUM mmol/L  --  131* 126* 125* 129* 130*   POTASSIUM mmol/L 4.4 4.6  4.6 4.1 3.9 4.0 4.3   CHLORIDE mmol/L  --  95* 89* 89* 91* 95*   CO2 mmol/L  --  31.6* 28.3 27.6 26.3 23.9   BUN mg/dL  --  14 10 10 13 9   CREATININE mg/dL  --  0.78 0.58 0.63 0.73 0.83   CALCIUM mg/dL  --  8.4 8.4 8.3 8.4 8.8   BILIRUBIN mg/dL  --   --   --   --  0.7 0.6   ALK PHOS U/L  --   --   --   --  54 66   ALT (SGPT) U/L  --   --   --   --  9 10   AST (SGOT) U/L  --   --   --   --  19 19   GLUCOSE mg/dL  --  114* 90 92 106* 266*     Estimated Creatinine Clearance: 42.8 mL/min (by C-G formula based on SCr of 0.78 mg/dL).  Results from last 7 days   Lab Units 12/03/20  2328 12/02/20  2312   SODIUM UR mmol/L  --  <20   CREATININE UR mg/dL  --  31.4   OSMOLALITY UR mOsm/kg  --  172   URIC ACID mg/dL 5.7  --      Results from last 7 days   Lab Units 12/04/20  1120 12/03/20  2328   MAGNESIUM mg/dL 2.1 2.0   PHOSPHORUS mg/dL  --  5.1*       Results from last 7 days   Lab Units 12/04/20  0416 11/29/20  0829 11/28/20  0838   WBC 10*3/mm3  --  6.69 7.18   HEMOGLOBIN g/dL  --  12.4 14.1   PLATELETS 10*3/mm3 245 226 294                   ASSESSMENT:     * No active hospital problems. *    1.  Chronic hyponatremia, slowly better, with hypervolemia primary player.  Sodium-avid urine c/w CHF.  Low urine osmolality, suggesting excess water intake and appropriate water diuresis.  Despite her significant lung disease, urine studies were not consistent with SIADH.   TSH normal  2.  Chronic lung disease with COPD: acute hypercapnic respiratory failure with hypoxemia that responded to BiPAP on 12/3  3.  Valvular heart disease  4.  Cardiomyopathy with ejection fraction 25% and CHF exacerbation  5.  Hypertension, slightly overcontrolled; low blood pressure will also drive ADH secretion  6.  CNS  bacteremia: suspect a contaminant  7.  Urinary retention      PLAN:  1.  Oral furosemide, but increase dose to 40 mg daily  2.  Hopefully home soon        Daniel Pickett MD  12/4/2020    21:07 EST

## 2020-12-06 ENCOUNTER — APPOINTMENT (OUTPATIENT)
Dept: GENERAL RADIOLOGY | Facility: HOSPITAL | Age: 85
End: 2020-12-06

## 2020-12-06 VITALS
BODY MASS INDEX: 20.83 KG/M2 | SYSTOLIC BLOOD PRESSURE: 115 MMHG | DIASTOLIC BLOOD PRESSURE: 78 MMHG | HEART RATE: 74 BPM | WEIGHT: 129.63 LBS | HEIGHT: 66 IN | TEMPERATURE: 97.4 F | OXYGEN SATURATION: 96 % | RESPIRATION RATE: 18 BRPM

## 2020-12-06 LAB
ANION GAP SERPL CALCULATED.3IONS-SCNC: 6.5 MMOL/L (ref 5–15)
BUN SERPL-MCNC: 13 MG/DL (ref 8–23)
BUN/CREAT SERPL: 24.5 (ref 7–25)
CALCIUM SPEC-SCNC: 8.5 MG/DL (ref 8.2–9.6)
CHLORIDE SERPL-SCNC: 89 MMOL/L (ref 98–107)
CO2 SERPL-SCNC: 31.5 MMOL/L (ref 22–29)
CREAT SERPL-MCNC: 0.53 MG/DL (ref 0.57–1)
GFR SERPL CREATININE-BSD FRML MDRD: 107 ML/MIN/1.73
GLUCOSE BLDC GLUCOMTR-MCNC: 106 MG/DL (ref 70–130)
GLUCOSE BLDC GLUCOMTR-MCNC: 123 MG/DL (ref 70–130)
GLUCOSE BLDC GLUCOMTR-MCNC: 93 MG/DL (ref 70–130)
GLUCOSE SERPL-MCNC: 93 MG/DL (ref 65–99)
POTASSIUM SERPL-SCNC: 4 MMOL/L (ref 3.5–5.2)
SODIUM SERPL-SCNC: 127 MMOL/L (ref 136–145)

## 2020-12-06 PROCEDURE — 94799 UNLISTED PULMONARY SVC/PX: CPT

## 2020-12-06 PROCEDURE — 94660 CPAP INITIATION&MGMT: CPT

## 2020-12-06 PROCEDURE — 25010000002 ENOXAPARIN PER 10 MG: Performed by: INTERNAL MEDICINE

## 2020-12-06 PROCEDURE — 82962 GLUCOSE BLOOD TEST: CPT

## 2020-12-06 PROCEDURE — 71045 X-RAY EXAM CHEST 1 VIEW: CPT

## 2020-12-06 PROCEDURE — 80048 BASIC METABOLIC PNL TOTAL CA: CPT | Performed by: INTERNAL MEDICINE

## 2020-12-06 RX ORDER — FUROSEMIDE 40 MG/1
40 TABLET ORAL DAILY
Qty: 30 TABLET | Refills: 0 | Status: SHIPPED | OUTPATIENT
Start: 2020-12-07

## 2020-12-06 RX ORDER — GABAPENTIN 300 MG/1
300 CAPSULE ORAL EVERY 12 HOURS
Qty: 60 CAPSULE | Refills: 0 | Status: SHIPPED | OUTPATIENT
Start: 2020-12-06

## 2020-12-06 RX ADMIN — FUROSEMIDE 40 MG: 40 TABLET ORAL at 09:02

## 2020-12-06 RX ADMIN — METOPROLOL TARTRATE 12.5 MG: 25 TABLET, FILM COATED ORAL at 09:06

## 2020-12-06 RX ADMIN — ATORVASTATIN CALCIUM 10 MG: 20 TABLET, FILM COATED ORAL at 09:01

## 2020-12-06 RX ADMIN — SODIUM CHLORIDE, PRESERVATIVE FREE 10 ML: 5 INJECTION INTRAVENOUS at 09:02

## 2020-12-06 RX ADMIN — BUDESONIDE AND FORMOTEROL FUMARATE DIHYDRATE 2 PUFF: 160; 4.5 AEROSOL RESPIRATORY (INHALATION) at 07:38

## 2020-12-06 RX ADMIN — GABAPENTIN 300 MG: 300 CAPSULE ORAL at 09:01

## 2020-12-06 RX ADMIN — ASPIRIN 81 MG: 81 TABLET, COATED ORAL at 09:01

## 2020-12-06 RX ADMIN — ENOXAPARIN SODIUM 40 MG: 40 INJECTION SUBCUTANEOUS at 12:28

## 2020-12-06 NOTE — DISCHARGE SUMMARY
Date of Discharge:  12/6/2020    Discharge Diagnoses:  1. Acute hypoxemic and hypercapnic respiratory failure on chronic respiratory failure she chronically uses 3 L O2 at home  2. Acute pulmonary edema secondary to CHF  3. Acute on chronic systolic CHF  4. Cardiomyopathy LVEF 25%  5. Valvular heart disease with moderate aortic and mitral stenosis  6. Hyponatremia acute on chronic  7. COPD  8. Pulmonary hypertension  9. Essential hypertension  10. Urinary retention possibly exacerbated by anticholinergic  11. Hyperlipidemia  12. Diarrhea      Hospital Course  Patient is a 89 y.o. female presented with with cardiomyopathy and valvular heart disease who presented with CHF pulmonary edema and acute respiratory failure on chronic respiratory failure she uses 3 L usually at home she has COPD and pulmonary hypertension.  She has diuresed well and her respiratory failure has improved markedly she is actually oxygenating very well on 2 L O2.  She has been seen by both cardiology and nephrology.  Dr. Pineda.  Has seen her he recommends at 1500 cc a day fluid restriction continuing Lasix 40 mg daily with weekly BMP for at least 3 weeks.  We recommend monitoring her weight daily if she goes up more than 2 pounds in a day or 3 pounds in a week she should be assessed for increasing diuretic her least a as needed dose.  She has a history of some chronic diarrhea that she treats with Imodium well.  She had some urinary retention and it looks like this was primarily related to anticholinergic therapy.  I would recommend just monitoring to make sure she is able to continue to void.  She has some acute on chronic hyponatremia that is felt probably primarily be due to her CHF she is actually improved with this but again this is something to monitor with her weekly BMPs.  Patient is a DO NOT INTUBATE/DO NOT RESUSCITATE.    Procedures Performed         Consults:   Consults     Date and Time Order Name Status Description     12/4/2020 1451 Inpatient Cardiology Consult      12/2/2020 1440 Inpatient Urology Consult Completed     12/2/2020 1440 Inpatient Nephrology Consult Completed     11/28/2020 0946 Inpatient Cardiology Consult Completed     11/28/2020 0935 Pulmonology (on-call MD unless specified) Completed           Pertinent Test Results:   Labs:  Results from last 7 days   Lab Units 12/06/20  0452 12/05/20  0440 12/04/20  1120 12/03/20  2328   GLUCOSE mg/dL 93 75  --  114*   SODIUM mmol/L 127* 125*  --  131*   POTASSIUM mmol/L 4.0 4.9 4.4 4.6  4.6   MAGNESIUM mg/dL  --   --  2.1 2.0   CO2 mmol/L 31.5* 29.2*  --  31.6*   CHLORIDE mmol/L 89* 90*  --  95*   ANION GAP mmol/L 6.5 5.8  --  4.4*   CREATININE mg/dL 0.53* 0.60  --  0.78   BUN mg/dL 13 13  --  14   BUN / CREAT RATIO  24.5 21.7  --  17.9   CALCIUM mg/dL 8.5 8.4  --  8.4   EGFR IF NONAFRICN AM mL/min/1.73 107 93  --  69   ALBUMIN g/dL  --  3.00*  --  3.10*     Estimated Creatinine Clearance: 39 mL/min (A) (by C-G formula based on SCr of 0.53 mg/dL (L)).      Results from last 7 days   Lab Units 12/04/20  0416   PLATELETS 10*3/mm3 245     Results from last 7 days   Lab Units 12/03/20  1741   PH, ARTERIAL pH units 7.090*   PO2 ART mm Hg 50.4*   PCO2, ARTERIAL mm Hg 113.4*   HCO3 ART mmol/L 34.4*     Results from last 7 days   Lab Units 12/03/20  2328   TROPONIN T ng/mL <0.010         Results from last 7 days   Lab Units 12/04/20  0416   TSH uIU/mL 0.924               Imaging Results (Last 72 Hours)     Procedure Component Value Units Date/Time    XR Chest 1 View [[id number]] Collected: 12/06/20 0627     Updated: 12/06/20 0632    Narrative:      XR CHEST 1 VW-     HISTORY: Female who is 89 years-old, pulmonary edema     TECHNIQUE: Frontal view of the chest     COMPARISON: 12/03/2020     FINDINGS: The heart appears mildly enlarged. Left-sided pacemaker and  cardiac leads are seen. Aorta is tortuous, calcified. Pulmonary  vasculature is less congested. Previous extensive bilateral  infiltrates  show conspicuous interval improvement, although not entirely resolved,  continued follow-up recommended. Small left pleural effusion is  suggested. No pneumothorax. No acute osseous process.          Impression:      Partial interval improvement, continued follow-up recommended.              This report was finalized on 12/6/2020 6:29 AM by Dr. Fabricio Vicente M.D.       XR Chest 1 View [[id number]] Collected: 12/03/20 1817     Updated: 12/03/20 1823    Narrative:      XR CHEST 1 VW-     HISTORY: Female who is 89 years-old,  respiratory distress     TECHNIQUE: Frontal views of the chest     COMPARISON: 11/28/2020     FINDINGS: The heart size is borderline. Left-sided pacemaker and cardiac  leads are seen. Aorta is tortuous, calcified. Pulmonary vasculature is  congested. Fairly extensive bilateral alveolar interstitial infiltrates,  right more than left, may reflect edema and/or pneumonia, appears mildly  worsened since the prior exam. Small bilateral pleural effusions are  suggested. No pneumothorax. No acute osseous process.       Impression:      Mild interval worsening of extensive bilateral infiltrates.     This report was finalized on 12/3/2020 6:20 PM by Dr. Fabricio Vicente M.D.           Results for orders placed during the hospital encounter of 11/28/20   Adult Transthoracic Echo Complete W/ Cont if Necessary Per Protocol    Addendum · Left ventricular systolic function is severely decreased.Calculated LVEF  is 28.2%. Left ventricular wall thickness is consistent with moderate  concentric hypertrophy. · Left ventricular diastolic dysfunction is noted with elevated left  atrial filling pressure. · There is severe calcification of the aortic valve.Moderate aortic valve  stenosis is present:Peak velocity of the flow distal to the aortic valve  is 263 cm/s. Aortic valve maximum pressure gradient is 27.7 mmHg. Aortic  valve mean pressure gradient is 15 mmHg. Aortic valve dimensionless index   is 0.3 . · Moderate mitral valve stenosis is present. There is mild to moderate  eccentric MR. · Estimated right ventricular systolic pressure from tricuspid  regurgitation is moderately elevated (45-55 mmHg). Calculated right  ventricular systolic pressure from tricuspid regurgitation is 48 mmHg. · There is protruding plaque in the proximal aorta present but can't rule  out a refraction artifact from proximal aorta.        Abdoulaye Gardner MD 12/2/2020  4:48 PM          Narrative · Left ventricular systolic function is severely decreased.Calculated LVEF   is 28.2%. Left ventricular wall thickness is consistent with moderate   concentric hypertrophy.  · Left ventricular diastolic dysfunction is noted with elevated left   atrial filling pressure.  · There is severe calcification of the aortic valve.Moderate aortic valve   stenosis is present:Peak velocity of the flow distal to the aortic valve   is 263 cm/s. Aortic valve maximum pressure gradient is 27.7 mmHg. Aortic   valve mean pressure gradient is 15 mmHg. Aortic valve dimensionless index   is 0.3 .  · Moderate mitral valve stenosis is present. There is mild to moderate   eccentric MR.  · Estimated right ventricular systolic pressure from tricuspid   regurgitation is moderately elevated (45-55 mmHg). Calculated right   ventricular systolic pressure from tricuspid regurgitation is 48 mmHg.              Condition on Discharge: Much improved    Vital Signs  Temp:  [97.4 °F (36.3 °C)-98.3 °F (36.8 °C)] 97.4 °F (36.3 °C)  Heart Rate:  [65-80] 74  Resp:  [16-18] 18  BP: (115-138)/(57-88) 115/78    Physical Exam:    General Appearance: Well-developed elderly white female resting in bed in no apparent distress  Eyes: Conjunctiva are clear and anicteric  ENT: Mucous membranes are moist no erythema or exudates  Neck: No adenopathy no thyromegaly trachea midline, I really do not appreciate jugular venous distention  Lungs: Clear no wheezes no rales no rhonchi  Cardiac:  Regular rate rhythm murmur loudest at the lower left sternal border  Abdomen: Soft no palpable hepatosplenomegaly  : Not examined  Musculoskeletal: Grossly normal  Skin: Skin is thin but warm and dry no jaundice no petechiae  Neuro: She is very pleasant she is following commands with all 4 extremities seems to be pretty oriented least x3  Extremities/P Vascular: No clubbing no cyanosis no edema palpable radial and dorsalis pedis pulses  MSE: Seems to be in very good spirits pleasant lady    Discharge Disposition  Skilled Nursing Facility (DC - External)    Discharge Medications     Discharge Medications      New Medications      Instructions Start Date   budesonide-formoterol 160-4.5 MCG/ACT inhaler  Commonly known as: SYMBICORT   2 puffs, Inhalation, 2 Times Daily - RT      metoprolol tartrate 25 MG tablet  Commonly known as: LOPRESSOR   12.5 mg, Oral, Every 12 Hours Scheduled         Changes to Medications      Instructions Start Date   furosemide 40 MG tablet  Commonly known as: LASIX  What changed:   · medication strength  · how much to take  · when to take this   40 mg, Oral, Daily   Start Date: December 7, 2020        Continue These Medications      Instructions Start Date   aspirin 81 MG tablet   1 tablet, Oral, Daily      EYE DROPS OP   Ophthalmic      gabapentin 300 MG capsule  Commonly known as: NEURONTIN   1 capsule, Oral, 2 Times Daily      simvastatin 20 MG tablet  Commonly known as: ZOCOR   20 mg, Oral, Nightly      Timolol Maleate 0.5 % (DAILY) solution   1 drop, Ophthalmic, Daily         Stop These Medications    amLODIPine 10 MG tablet  Commonly known as: NORVASC     atenolol 25 MG tablet  Commonly known as: TENORMIN     CALCIUM + D PO     losartan 100 MG tablet  Commonly known as: COZAAR     Multi-Vitamin tablet tablet  Generic drug: multivitamin     promethazine 25 MG tablet  Commonly known as: PHENERGAN     umeclidinium-vilanterol 62.5-25 MCG/INH aerosol powder  inhaler  Commonly known as:  ANORO ELLIPTA            Discharge Diet: Healthy heart diet with a 1500 cc/day fluid restriction    Activity at Discharge: Up with assistance  Recommend she stay on 2 L nasal cannula O2  Follow-up Appointments  No future appointments.      Test Results Pending at Discharge    Discussed discharge with her son Chaparro on the phone.  Lul Delgado MD  12/06/20  14:18 EST    Time:

## 2020-12-06 NOTE — PROGRESS NOTES
LOS: 7 days   Patient Care Team:  Demetris Kennedy MD as PCP - General    Subjective     Patient is very happy see says she is doing great not short of breath on 2 L O2 she just wants to go home    Review of Systems:          Objective     Vital Signs  Vital Sign Min/Max for last 24 hours  Temp  Min: 97.6 °F (36.4 °C)  Max: 98.6 °F (37 °C)   BP  Min: 117/64  Max: 143/59   Pulse  Min: 69  Max: 80   Resp  Min: 16  Max: 16   SpO2  Min: 94 %  Max: 99 %   Flow (L/min)  Min: 2  Max: 4   Weight  Min: 61.8 kg (136 lb 3.9 oz)  Max: 61.8 kg (136 lb 3.9 oz)        Ventilator/Non-Invasive Ventilation Settings (From admission, onward)     Start     Ordered    12/03/20 1759  NIPPV (CPAP or BIPAP)  Until Discontinued     Question Answer Comment   Indication: Acute Respiratory Failure    Type: AVAPS/PC/PS    NIPPV Mask Interface: Per Patient Preference    Backup Rate 24    Target VT (mL) 400    EPAP/PEEP (cm H2O) 5    Min Pressure (cm H2O) 14    Max Pressure (cm H2O) 30    Titrate for SPO2 90% - 95%        12/03/20 1759    11/28/20 0838  NIPPV (CPAP or BIPAP)  Until Discontinued,   Status:  Canceled     Question Answer Comment   Type: BIPAP    NIPPV Mask Interface: Full Face Mask        11/28/20 0837                             Body mass index is 22.16 kg/m².  I/O last 3 completed shifts:  In: 450 [P.O.:450]  Out: 1075 [Urine:1075]  I/O this shift:  In: 120 [P.O.:120]  Out: -         Physical Exam:  General Appearance: Well-developed elderly white female resting in bed in no apparent distress  Eyes: Conjunctiva are clear and anicteric  ENT: Mucous membranes are moist no erythema or exudates  Neck: No adenopathy no thyromegaly trachea midline, I really do not appreciate jugular venous distention  Lungs: Clear no wheezes no rales no rhonchi  Cardiac: Regular rate rhythm murmur loudest at the lower left sternal border  Abdomen: Soft no palpable hepatosplenomegaly  : Not examined  Musculoskeletal: Grossly  normal  Skin: Skin is thin but warm and dry no jaundice no petechiae  Neuro: She is very pleasant she is following commands with all 4 extremities seems to be pretty oriented least x3  Extremities/P Vascular: No clubbing no cyanosis no edema palpable radial and dorsalis pedis pulses  MSE: Seems to be in very good spirits pleasant lady       Labs:  Results from last 7 days   Lab Units 12/05/20  0440 12/04/20  1120 12/03/20  2328 12/01/20  0818 11/30/20  1031 11/29/20  0913   GLUCOSE mg/dL 75  --  114* 90 92 106*   SODIUM mmol/L 125*  --  131* 126* 125* 129*   POTASSIUM mmol/L 4.9 4.4 4.6  4.6 4.1 3.9 4.0   MAGNESIUM mg/dL  --  2.1 2.0  --   --   --    CO2 mmol/L 29.2*  --  31.6* 28.3 27.6 26.3   CHLORIDE mmol/L 90*  --  95* 89* 89* 91*   ANION GAP mmol/L 5.8  --  4.4* 8.7 8.4 11.7   CREATININE mg/dL 0.60  --  0.78 0.58 0.63 0.73   BUN mg/dL 13  --  14 10 10 13   BUN / CREAT RATIO  21.7  --  17.9 17.2 15.9 17.8   CALCIUM mg/dL 8.4  --  8.4 8.4 8.3 8.4   EGFR IF NONAFRICN AM mL/min/1.73 93  --  69 97 88 74   ALK PHOS U/L  --   --   --   --   --  54   TOTAL PROTEIN g/dL  --   --   --   --   --  6.0   ALT (SGPT) U/L  --   --   --   --   --  9   AST (SGOT) U/L  --   --   --   --   --  19   BILIRUBIN mg/dL  --   --   --   --   --  0.7   ALBUMIN g/dL 3.00*  --  3.10*  --   --  3.40*   GLOBULIN gm/dL  --   --   --   --   --  2.6     Estimated Creatinine Clearance: 41 mL/min (by C-G formula based on SCr of 0.6 mg/dL).      Results from last 7 days   Lab Units 12/04/20  0416 11/29/20  0829   WBC 10*3/mm3  --  6.69   RBC 10*6/mm3  --  4.18   HEMOGLOBIN g/dL  --  12.4   HEMATOCRIT %  --  37.5   MCV fL  --  89.7   MCH pg  --  29.7   MCHC g/dL  --  33.1   RDW %  --  13.1   RDW-SD fl  --  42.6   MPV fL  --  9.6   PLATELETS 10*3/mm3 245 226   NEUTROPHIL % %  --  82.1*   LYMPHOCYTE % %  --  9.9*   MONOCYTES % %  --  6.7   EOSINOPHIL % %  --  0.4   BASOPHIL % %  --  0.3   IMM GRAN % %  --  0.6*   NEUTROS ABS 10*3/mm3  --  5.49    LYMPHS ABS 10*3/mm3  --  0.66*   MONOS ABS 10*3/mm3  --  0.45   EOS ABS 10*3/mm3  --  0.03   BASOS ABS 10*3/mm3  --  0.02   IMMATURE GRANS (ABS) 10*3/mm3  --  0.04   NRBC /100 WBC  --  0.0     Results from last 7 days   Lab Units 12/03/20  1741   PH, ARTERIAL pH units 7.090*   PO2 ART mm Hg 50.4*   PCO2, ARTERIAL mm Hg 113.4*   HCO3 ART mmol/L 34.4*     Results from last 7 days   Lab Units 12/03/20  2328 11/29/20  0019   TROPONIN T ng/mL <0.010 0.021         Results from last 7 days   Lab Units 12/04/20  0416   TSH uIU/mL 0.924             Microbiology Results (last 10 days)     Procedure Component Value - Date/Time    COVID PRE-OP / PRE-PROCEDURE SCREENING ORDER (NO ISOLATION) - Swab, Nasopharynx [[id number]]  (Normal) Collected: 12/03/20 1650    Lab Status: Final result Specimen: Swab from Nasopharynx Updated: 12/03/20 1804    Narrative:      The following orders were created for panel order COVID PRE-OP / PRE-PROCEDURE SCREENING ORDER (NO ISOLATION) - Swab, Nasopharynx.  Procedure                               Abnormality         Status                     ---------                               -----------         ------                     Respiratory Panel PCR w/...[[id number]]  Normal              Final result                 Please view results for these tests on the individual orders.    Respiratory Panel PCR w/COVID-19(SARS-CoV-2) [facility] In-House, NP Swab in UTM/VTM, 3-4 HR TAT - Swab, Nasopharynx [[id number]]  (Normal) Collected: 12/03/20 1650    Lab Status: Final result Specimen: Swab from Nasopharynx Updated: 12/03/20 1804     ADENOVIRUS, PCR Not Detected     Coronavirus 229E Not Detected     Coronavirus HKU1 Not Detected     Coronavirus NL63 Not Detected     Coronavirus OC43 Not Detected     COVID19 Not Detected     Human Metapneumovirus Not Detected     Human Rhinovirus/Enterovirus Not Detected     Influenza A PCR Not Detected     Influenza B PCR Not Detected     Parainfluenza  Virus 1 Not Detected     Parainfluenza Virus 2 Not Detected     Parainfluenza Virus 3 Not Detected     Parainfluenza Virus 4 Not Detected     RSV, PCR Not Detected     Bordetella pertussis pcr Not Detected     Bordetella parapertussis PCR Not Detected     Chlamydophila pneumoniae PCR Not Detected     Mycoplasma pneumo by PCR Not Detected    Narrative:      Fact sheet for providers: https://docs.[employer]/wp-content/uploads/NWL5765-7009-ZM8.1-EUA-Provider-Fact-Sheet-3.pdf    Fact sheet for patients: https://docs.[employer]/wp-content/uploads/FVH3871-3923-FB6.1-EUA-Patient-Fact-Sheet-1.pdf    Blood Culture - Blood, Arm, Right [[id number]]  (Abnormal) Collected: 11/28/20 0919    Lab Status: Final result Specimen: Blood from Arm, Right Updated: 11/30/20 0625     Blood Culture Staphylococcus, coagulase negative     Comment: Probable contaminant requires clinical correlation, susceptibility not performed unless requested by physician.          Isolated from Anaerobic Bottle     Gram Stain Anaerobic Bottle Gram positive cocci in clusters    Blood Culture ID, PCR - Blood, Arm, Right [[id number]]  (Abnormal) Collected: 11/28/20 0919    Lab Status: Final result Specimen: Blood from Arm, Right Updated: 11/29/20 0817     BCID, PCR Staphylococcus spp, not aureus. Identification by BCID PCR.    Blood Culture - Blood, Arm, Left [[id number]] Collected: 11/28/20 0900    Lab Status: Final result Specimen: Blood from Arm, Left Updated: 12/03/20 0930     Blood Culture No growth at 5 days    Respiratory Panel PCR w/COVID-19(SARS-CoV-2) [facility] In-House, NP Swab in UTM/VTM, 3-4 HR TAT - Swab, Nasopharynx [[id number]]  (Normal) Collected: 11/28/20 0838    Lab Status: Final result Specimen: Swab from Nasopharynx Updated: 11/28/20 0931     ADENOVIRUS, PCR Not Detected     Coronavirus 229E Not Detected     Coronavirus HKU1 Not Detected     Coronavirus NL63 Not Detected     Coronavirus OC43 Not Detected     COVID19 Not  Detected     Human Metapneumovirus Not Detected     Human Rhinovirus/Enterovirus Not Detected     Influenza A PCR Not Detected     Influenza B PCR Not Detected     Parainfluenza Virus 1 Not Detected     Parainfluenza Virus 2 Not Detected     Parainfluenza Virus 3 Not Detected     Parainfluenza Virus 4 Not Detected     RSV, PCR Not Detected     Bordetella pertussis pcr Not Detected     Bordetella parapertussis PCR Not Detected     Chlamydophila pneumoniae PCR Not Detected     Mycoplasma pneumo by PCR Not Detected    Narrative:      Fact sheet for providers: https://docs.[employer]/wp-content/uploads/WRN1603-2596-SC4.1-EUA-Provider-Fact-Sheet-3.pdf    Fact sheet for patients: https://docs.[employer]/wp-content/uploads/MOB0592-2473-BG5.1-EUA-Patient-Fact-Sheet-1.pdf              aspirin, 81 mg, Oral, Daily  atorvastatin, 10 mg, Oral, Daily  budesonide-formoterol, 2 puff, Inhalation, BID - RT  enoxaparin, 40 mg, Subcutaneous, Q24H  furosemide, 40 mg, Oral, Daily  gabapentin, 300 mg, Oral, Q12H  metoprolol tartrate, 12.5 mg, Oral, Q12H  sodium chloride, 10 mL, Intravenous, Q12H           Diagnostics:  Xr Chest 1 View    Result Date: 12/3/2020  XR CHEST 1 VW-  HISTORY: Female who is 89 years-old,  respiratory distress  TECHNIQUE: Frontal views of the chest  COMPARISON: 11/28/2020  FINDINGS: The heart size is borderline. Left-sided pacemaker and cardiac leads are seen. Aorta is tortuous, calcified. Pulmonary vasculature is congested. Fairly extensive bilateral alveolar interstitial infiltrates, right more than left, may reflect edema and/or pneumonia, appears mildly worsened since the prior exam. Small bilateral pleural effusions are suggested. No pneumothorax. No acute osseous process.      Mild interval worsening of extensive bilateral infiltrates.  This report was finalized on 12/3/2020 6:20 PM by Dr. Fabricio Vicente M.D.      Xr Chest 1 View    Result Date: 11/28/2020  ONE VIEW PORTABLE CHEST AT 8:45 AM   HISTORY: Shortness of breath. Possible Covid 19 pneumonia.  FINDINGS: There is cardiomegaly with a pacemaker in place and there is extensive diffuse interstitial prominence and haziness combined with probable small pleural effusions layering posteriorly and most consistent with severe interstitial pulmonary edema that is new since study of 06/13/2015. I cannot completely exclude possible superimposed pneumonia, particularly at the right base.  This report was finalized on 11/28/2020 9:32 AM by Dr. León Zavala M.D.      Results for orders placed during the hospital encounter of 11/28/20   Adult Transthoracic Echo Complete W/ Cont if Necessary Per Protocol    Addendum · Left ventricular systolic function is severely decreased.Calculated LVEF  is 28.2%. Left ventricular wall thickness is consistent with moderate  concentric hypertrophy. · Left ventricular diastolic dysfunction is noted with elevated left  atrial filling pressure. · There is severe calcification of the aortic valve.Moderate aortic valve  stenosis is present:Peak velocity of the flow distal to the aortic valve  is 263 cm/s. Aortic valve maximum pressure gradient is 27.7 mmHg. Aortic  valve mean pressure gradient is 15 mmHg. Aortic valve dimensionless index  is 0.3 . · Moderate mitral valve stenosis is present. There is mild to moderate  eccentric MR. · Estimated right ventricular systolic pressure from tricuspid  regurgitation is moderately elevated (45-55 mmHg). Calculated right  ventricular systolic pressure from tricuspid regurgitation is 48 mmHg. · There is protruding plaque in the proximal aorta present but can't rule  out a refraction artifact from proximal aorta.        Abdoulaye Gardner MD 12/2/2020  4:48 PM          Narrative · Left ventricular systolic function is severely decreased.Calculated LVEF   is 28.2%. Left ventricular wall thickness is consistent with moderate   concentric hypertrophy.  · Left ventricular diastolic dysfunction  is noted with elevated left   atrial filling pressure.  · There is severe calcification of the aortic valve.Moderate aortic valve   stenosis is present:Peak velocity of the flow distal to the aortic valve   is 263 cm/s. Aortic valve maximum pressure gradient is 27.7 mmHg. Aortic   valve mean pressure gradient is 15 mmHg. Aortic valve dimensionless index   is 0.3 .  · Moderate mitral valve stenosis is present. There is mild to moderate   eccentric MR.  · Estimated right ventricular systolic pressure from tricuspid   regurgitation is moderately elevated (45-55 mmHg). Calculated right   ventricular systolic pressure from tricuspid regurgitation is 48 mmHg.              Active Hospital Problems   No active problems to display.      Resolved Hospital Problems    Diagnosis Date Resolved POA   • Acute respiratory failure with hypoxia and hypercapnia (CMS/HCC) [J96.01, J96.02] 12/03/2020 Yes         Assessment/Plan     1. Acute hypoxemic and hypercapnic respiratory failure weaning O2 off she is at 2 L and looks like she can wean further.  2. Acute pulmonary edema we will get follow-up x-ray in the morning  3. Acute systolic CHF LVEF approximately 25%.  Diuretics being managed by nephrology.  Cardiology has signed off and recommends following up with her usual cardiologist at discharge  4. Hyponatremia nephrology managing she has some chronic hyponatremia related to CHF will fluid restrict 1500 cc a day per renal and continue Lasix 40 mg daily weekly BMP x3 weeks  5. COPD continue bronchodilators  6. Pulmonary hypertension by echocardiogram moderate  7. Hyperglycemia sliding scale insulin  8. Hypertension continue home meds  9. Hyperlipidemia continue home meds  10. Valvular heart disease with moderate aortic stenosis and moderate mitral stenosis.  11. Diarrhea as needed Imodium  12. Urinary retention anticholinergic bronchodilator stopped to see if this helps   13. DNR/DNI    Plan for disposition: SHe may need placement if  she has to continue to have a Lucas catheter and plan is to [facility]    Lul Delgado MD  12/05/20  21:09 EST    Time:

## 2020-12-06 NOTE — PROGRESS NOTES
"   LOS: 8 days    Patient Care Team:  Demetris Kennedy MD as PCP - General    Chief Complaint:    Chief Complaint   Patient presents with   • Shortness of Breath     Follow UP hyponatremia  Subjective     Interval History:   Sleeping, confused at times    Objective     Vital Signs  Temp:  [97.4 °F (36.3 °C)-98.3 °F (36.8 °C)] 97.4 °F (36.3 °C)  Heart Rate:  [65-80] 74  Resp:  [16-18] 18  BP: (115-138)/(57-88) 115/78    Flowsheet Rows      First Filed Value   Admission Height  167.6 cm (66\") Documented at 11/28/2020 0846   Admission Weight  77.6 kg (171 lb) Documented at 11/28/2020 0846          No intake/output data recorded.  I/O last 3 completed shifts:  In: 780 [P.O.:780]  Out: 1325 [Urine:1325]    Intake/Output Summary (Last 24 hours) at 12/6/2020 1422  Last data filed at 12/6/2020 0300  Gross per 24 hour   Intake 540 ml   Output 450 ml   Net 90 ml       Physical Exam:  GEN frail, sleeping  Neck supple no JVD  Lungs CTA bilat  CV RRR no m/g  abd soft NT/ND  vasc no pedal/ankle edema     Results Review:    Results from last 7 days   Lab Units 12/06/20  0452 12/05/20  0440 12/04/20  1120 12/03/20  2328   SODIUM mmol/L 127* 125*  --  131*   POTASSIUM mmol/L 4.0 4.9 4.4 4.6  4.6   CHLORIDE mmol/L 89* 90*  --  95*   CO2 mmol/L 31.5* 29.2*  --  31.6*   BUN mg/dL 13 13  --  14   CREATININE mg/dL 0.53* 0.60  --  0.78   CALCIUM mg/dL 8.5 8.4  --  8.4   GLUCOSE mg/dL 93 75  --  114*       Estimated Creatinine Clearance: 39 mL/min (A) (by C-G formula based on SCr of 0.53 mg/dL (L)).    Results from last 7 days   Lab Units 12/05/20  0440 12/04/20  1120 12/03/20  2328   MAGNESIUM mg/dL  --  2.1 2.0   PHOSPHORUS mg/dL 3.3  --  5.1*       Results from last 7 days   Lab Units 12/03/20  2328   URIC ACID mg/dL 5.7       Results from last 7 days   Lab Units 12/04/20  0416   PLATELETS 10*3/mm3 245               Imaging Results (Last 24 Hours)     Procedure Component Value Units Date/Time    XR Chest 1 View [[id number]] " Collected: 12/06/20 0627     Updated: 12/06/20 0632    Narrative:      XR CHEST 1 VW-     HISTORY: Female who is 89 years-old, pulmonary edema     TECHNIQUE: Frontal view of the chest     COMPARISON: 12/03/2020     FINDINGS: The heart appears mildly enlarged. Left-sided pacemaker and  cardiac leads are seen. Aorta is tortuous, calcified. Pulmonary  vasculature is less congested. Previous extensive bilateral infiltrates  show conspicuous interval improvement, although not entirely resolved,  continued follow-up recommended. Small left pleural effusion is  suggested. No pneumothorax. No acute osseous process.          Impression:      Partial interval improvement, continued follow-up recommended.              This report was finalized on 12/6/2020 6:29 AM by Dr. Fabricio Vicente M.D.           aspirin, 81 mg, Oral, Daily  atorvastatin, 10 mg, Oral, Daily  budesonide-formoterol, 2 puff, Inhalation, BID - RT  enoxaparin, 40 mg, Subcutaneous, Q24H  furosemide, 40 mg, Oral, Daily  gabapentin, 300 mg, Oral, Q12H  metoprolol tartrate, 12.5 mg, Oral, Q12H  sodium chloride, 10 mL, Intravenous, Q12H           Medication Review:   Current Facility-Administered Medications   Medication Dose Route Frequency Provider Last Rate Last Admin   • acetaminophen (TYLENOL) tablet 650 mg  650 mg Oral Q4H PRN Carrington Olivares MD   325 mg at 12/02/20 1303    Or   • acetaminophen (TYLENOL) suppository 650 mg  650 mg Rectal Q4H PRN Carrington Olivares MD       • aspirin EC tablet 81 mg  81 mg Oral Daily Carrington Olivares MD   81 mg at 12/06/20 0901   • atorvastatin (LIPITOR) tablet 10 mg  10 mg Oral Daily Carrington Olivares MD   10 mg at 12/06/20 0901   • budesonide-formoterol (SYMBICORT) 160-4.5 MCG/ACT inhaler 2 puff  2 puff Inhalation BID - RT Carrington Olivares MD   2 puff at 12/06/20 0738   • enalaprilat (VASOTEC) injection 0.625 mg  0.625 mg Intravenous Q6H PRN Carrington Olivares MD       • enoxaparin (LOVENOX) syringe 40 mg  40 mg Subcutaneous  Q24H Carrington Olivares MD   40 mg at 12/06/20 1228   • furosemide (LASIX) tablet 40 mg  40 mg Oral Daily Daniel Pickett MD   40 mg at 12/06/20 0902   • gabapentin (NEURONTIN) capsule 300 mg  300 mg Oral Q12H Carrington Olivares MD   300 mg at 12/06/20 0901   • ipratropium-albuterol (DUO-NEB) nebulizer solution 3 mL  3 mL Nebulization Q2H PRN Carrington Olivares MD       • loperamide (IMODIUM) capsule 2 mg  2 mg Oral 4x Daily PRN Carrington Olivares MD   2 mg at 11/30/20 2006   • Magnesium Sulfate 2 gram Bolus, followed by 8 gram infusion (total Mg dose 10 grams)- Mg less than or equal to 1mg/dL  2 g Intravenous PRN Carrington Olivares MD        Or   • Magnesium Sulfate 2 gram / 50mL Infusion (GIVE X 3 BAGS TO EQUAL 6GM TOTAL DOSE) - Mg 1.1 - 1.5 mg/dl  2 g Intravenous PRCarrington Edmonds MD        Or   • Magnesium Sulfate 4 gram infusion- Mg 1.6-1.9 mg/dL  4 g Intravenous PRN Carrington Olivares MD       • metoprolol tartrate (LOPRESSOR) tablet 12.5 mg  12.5 mg Oral Q12H David Jauregui MD   12.5 mg at 12/06/20 0906   • ondansetron (ZOFRAN) tablet 4 mg  4 mg Oral Q6H PRN Carrington Olivares MD        Or   • ondansetron (ZOFRAN) injection 4 mg  4 mg Intravenous Q6H PRCarrington Edmonds MD       • potassium chloride (K-DUR,KLOR-CON) ER tablet 40 mEq  40 mEq Oral PRN Carrington Olivares MD        Or   • potassium chloride (KLOR-CON) packet 40 mEq  40 mEq Oral PRCarrington Edmonds MD        Or   • potassium chloride 10 mEq in 100 mL IVPB  10 mEq Intravenous Q1H PRCarrington Edmonds MD       • sodium chloride 0.9 % flush 10 mL  10 mL Intravenous Q12H Carrington Olivares MD   10 mL at 12/06/20 0902   • sodium chloride 0.9 % flush 10 mL  10 mL Intravenous PRN Carrington Olivares MD           Assessment/Plan   1.  Chronic hyponatremia, related to CHF, no e/o SIADH.  Na labile, 125 - 130 for the most part, up slightly 127 today.  Vol stable, lasix inc'd 40mg couple days ago.  Fluid restrict 1500cc/day.  Renal fcn normal  2.  Chronic lung  disease with COPD: acute hypercapnic respiratory failure with hypoxemia that responded to BiPAP on 12/3  3.  Valvular heart disease  4.  Cardiomyopathy with ejection fraction 25% appears compensated now but vol status tenuous; Wt down 129 lbs, no periph edema or dyspnea currently  5.  Hypertension, controlled   6.  CNS bacteremia: suspect a contaminant  7.  Urinary retention    Plan  No objection to discharge to [facility] rehab if bed available.  She's 89 yo and I don't think we can optimize Na further.  Would continue fluid restriction 1500 cc/day and continue lasix 40mg daily.  Recommend check BMP weekly x 3 at [facility] after discharge, can fax these results to us at [phone].          * No active hospital problems. *              Francisco Pineda MD  12/06/20  14:22 EST

## 2020-12-06 NOTE — SIGNIFICANT NOTE
12/06/20 1457   OTHER   Discipline physical therapist   Rehab Time/Intention   Session Not Performed other (see comments)  (RN reports pt is about to leave)   Recommendation   PT - Next Appointment 12/07/20

## 2020-12-06 NOTE — PLAN OF CARE
Goal Outcome Evaluation:  Plan of Care Reviewed With: patient  Progress: improving  Outcome Summary: VSS, NSR, , no c/o pain or SOA, f/c d/c'd per order - bladder scan if no void in 4-6 hrs, rested well, plan is to d/c to [facility] rehab

## 2020-12-07 NOTE — PROGRESS NOTES
Case Management Discharge Note      Final Note: [facility] Skilled and transported by family    Provided Post Acute Provider List?: N/A  Provided Post Acute Provider Quality & Resource List?: N/A    Selected Continued Care - Discharged on 12/6/2020 Admission date: 11/28/2020 - Discharge disposition: Skilled Nursing Facility (DC - External)    Destination Coordination complete    Service Provider Selected Services Address Phone Fax Patient Preferred    [facility]  Skilled Nursing [address] [address] [phone] --          Durable Medical Equipment    No services have been selected for the patient.              Dialysis/Infusion    No services have been selected for the patient.              Home Medical Care Coordination complete    Service Provider Selected Services Address Phone Fax Patient Preferred    [facility] [address] [address] [phone] --          Therapy    No services have been selected for the patient.              Community Resources    No services have been selected for the patient.                  Transportation Services  Private: Car    Final Discharge Disposition Code: 03 - skilled nursing facility (SNF)

## 2021-03-02 DIAGNOSIS — Z23 IMMUNIZATION DUE: ICD-10-CM

## 2022-12-15 NOTE — PROGRESS NOTES
Department of Anesthesiology  Preprocedure Note       Name:  Xavi Tipton   Age:  62 y.o.  :  1964                                          MRN:  [id number]         Date:  12/15/2022      Surgeon: Margie Gu):  Michael Dickerson DO    Procedure: Procedure(s):  LEEP WITH TOP HAT  DILATATION AND CURETTAGE HYSTEROSCOPY CAUTERY ABLATION MYOSURE    Medications prior to admission:   Prior to Admission medications    Medication Sig Start Date End Date Taking? Authorizing Provider   valACYclovir (VALTREX) 1 g tablet Take 2 tablets by mouth 2 times daily 22   Sergio Corado MD   Red Yeast Rice Extract 600 MG TABS Take 2 tablets by mouth daily    Historical Provider, MD   Misc Natural Products (GLUCOSAMINE CHOND CMP ADVANCED PO) Take 2 tablets by mouth daily    Historical Provider, MD   atorvastatin (LIPITOR) 20 MG tablet Take 1 tablet by mouth daily  Patient not taking: Reported on 12/15/2022 11/28/22 11/28/23  Sergio Corado MD   Omega 3 1200 MG CAPS Take 4 capsules by mouth daily    Historical Provider, MD   Multiple Vitamins-Minerals (EYE-VITES PO) Take 1 capsule by mouth daily    Historical Provider, MD   Calcium-Vitamin D 600-200 MG-UNIT TABS Take 1 tablet by mouth daily    Historical Provider, MD Syed Pan Tea, Camillia sinensis, 200 MG CAPS Take 1 capsule by mouth 2 times daily.     Historical Provider, MD       Current medications:    Current Facility-Administered Medications   Medication Dose Route Frequency Provider Last Rate Last Admin    lidocaine PF 1 % injection 1 mL  1 mL IntraDERmal Once PRN Yessy Grant MD        lactated ringers infusion   IntraVENous Continuous Lindalezana Cosme  mL/hr at 12/15/22 1133 New Bag at 12/15/22 1133    sodium chloride flush 0.9 % injection 5-40 mL  5-40 mL IntraVENous 2 times per day Yessy Grant MD        sodium chloride flush 0.9 % injection 5-40 mL  5-40 mL IntraVENous PRN Elba Cosme MD        0.9 % sodium chloride infusion   IntraVENous PRN [facility]      Mar/chart reviewed  Follow up acute hypoxemic respiratory failure  she wants to go home  No chest pain, no cough    Vital Sign Min/Max for last 24 hours  Temp  Min: 97.4 °F (36.3 °C)  Max: 98.6 °F (37 °C)   BP  Min: 92/47  Max: 143/67   Pulse  Min: 66  Max: 93   Resp  Min: 16  Max: 22   SpO2  Min: 88 %  Max: 96 %   Flow (L/min)  Min: 0.5  Max: 1   No data recorded   860/815    Appears ill, axox3,   perrl, eomi, no icterus,  mmm, no jvd, trachea midline, neck supple,  chest fair ae bilaterally, no crackles, no wheezes,   rrr,   soft, nt, nd +bs,  no c/c/ trace edema  Skin warm, dry no rashes    Labs: 12/1: reviewed:  Pending    A/P:  1. Acute hypoxemic and hypercapnic respiratory failure -- wean oxygen as able  2. Acute systolic chf -- better today,medications per cardiology  3. Hyponatremia --  monitor  4. COPD - continue bronchodilators  5. Hyperglycemia -- SSI  6. HTN -- continue home meds  7. HLD -- conitnue home meds  8. Dnr/dni  9. Elevated troponin  10. Valvular heart disease --moderate aortic valve stenosis, mod mitral valve stenosis  11. 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 HEMORRHOIDS    COLPOSCOPY  04/07/2016    ASCUS HR HPV    COLPOSCOPY  10/28/2022    COLP- persistent HPV    GYNECOLOGIC CRYOSURGERY         Social History:    Social History     Tobacco Use    Smoking status: Never    Smokeless tobacco: Never   Substance Use Topics    Alcohol use:  No                                Counseling given: Not Answered      Vital Signs (Current):   Vitals:    12/15/22 1126   BP: 117/79   Pulse: 75   Resp: 16   Temp: 98 °F (36.7 °C)   TempSrc: Infrared   SpO2: 97%   Weight: 155 lb (70.3 kg)   Height: 5' 3\" (1.6 m)                                              BP Readings from Last 3 Encounters:   12/15/22 117/79   12/02/22 130/68   11/28/22 122/78       NPO Status: Time of last liquid consumption: 2230                        Time of last solid consumption: 1700                        Date of last liquid consumption: 12/14/22                        Date of last solid food consumption: 12/14/22    BMI:   Wt Readings from Last 3 Encounters:   12/15/22 155 lb (70.3 kg)   12/02/22 155 lb (70.3 kg)   11/28/22 160 lb (72.6 kg)     Body mass index is 27.46 kg/m². CBC:   Lab Results   Component Value Date/Time    WBC 5.9 12/02/2022 03:15 PM    RBC 4.62 12/02/2022 03:15 PM    RBC 4.42 06/10/2020 08:51 AM    HGB 13.6 12/02/2022 03:15 PM    HCT 40.2 12/02/2022 03:15 PM    MCV 86.9 12/02/2022 03:15 PM    RDW 13.6 12/02/2022 03:15 PM     12/02/2022 03:15 PM       CMP:   Lab Results   Component Value Date/Time     12/02/2022 03:15 PM    K 3.9 12/02/2022 03:15 PM     12/02/2022 03:15 PM    CO2 28 12/02/2022 03:15 PM    BUN 14 12/02/2022 03:15 PM    CREATININE 0.81 12/02/2022 03:15 PM    LABGLOM >60 12/02/2022 03:15 PM    GLUCOSE 91 12/02/2022 03:15 PM    GLUCOSE 90 06/10/2020 08:51 AM    PROT 7.4 12/02/2022 03:15 PM    CALCIUM 9.5 12/02/2022 03:15 PM    BILITOT 0.3 12/02/2022 03:15 PM    ALKPHOS 101 12/02/2022 03:15 PM    AST 48 12/02/2022 03:15 PM    ALT 25 12/02/2022 03:15 PM       POC Tests: No results for input(s): POCGLU, POCNA, POCK, POCCL, POCBUN, POCHEMO, POCHCT in the last 72 hours.     Coags:   Lab Results   Component Value Date/Time    PROTIME 11.7 12/02/2022 03:15 PM    INR 0.9 12/02/2022 03:15 PM    APTT 29.7 12/02/2022 03:15 PM       HCG (If Applicable):   Lab Results   Component Value Date    PREGTESTUR negative 05/31/2018        ABGs: No results found for: PHART, PO2ART, VUZ7FVW, CTB8XKB, BEART, X5EAOKCW     Type & Screen (If Applicable):  No results found for: LABABO, LABRH    Drug/Infectious Status (If Applicable):  No results found for: HIV, HEPCAB    COVID-19 Screening (If Applicable): No results found for: COVID19        Anesthesia Evaluation  Patient summary reviewed and Nursing notes reviewed no history of anesthetic complications:   Airway: Mallampati: III  TM distance: >3 FB   Neck ROM: full  Mouth opening: < 3 FB   Dental: normal exam         Pulmonary:Negative Pulmonary ROS and normal exam  breath sounds clear to auscultation                             Cardiovascular:  Exercise tolerance: good (>4 METS),   (+) hyperlipidemia        Rhythm: regular  Rate: normal                    Neuro/Psych:               GI/Hepatic/Renal: Neg GI/Hepatic/Renal ROS            Endo/Other: Negative Endo/Other ROS                    Abdominal:             Vascular: Other Findings:           Anesthesia Plan      general     ASA 2       Induction: intravenous. MIPS: Postoperative opioids intended and Prophylactic antiemetics administered. Anesthetic plan and risks discussed with patient. Plan discussed with CRNA.                     Asenath Rubinstein, MD   12/15/2022